# Patient Record
Sex: FEMALE | Race: WHITE | NOT HISPANIC OR LATINO | URBAN - METROPOLITAN AREA
[De-identification: names, ages, dates, MRNs, and addresses within clinical notes are randomized per-mention and may not be internally consistent; named-entity substitution may affect disease eponyms.]

---

## 2018-01-15 PROBLEM — M17.11 PRIMARY OSTEOARTHRITIS OF RIGHT KNEE: Status: ACTIVE | Noted: 2017-01-06

## 2018-01-15 PROBLEM — E55.9 VITAMIN D INSUFFICIENCY: Status: ACTIVE | Noted: 2017-04-26

## 2018-09-04 PROBLEM — Z80.0 FH: COLON CANCER: Status: ACTIVE | Noted: 2018-09-04

## 2020-10-08 ENCOUNTER — APPOINTMENT (RX ONLY)
Dept: URBAN - METROPOLITAN AREA CLINIC 23 | Facility: CLINIC | Age: 55
Setting detail: DERMATOLOGY
End: 2020-10-08

## 2020-10-08 VITALS — TEMPERATURE: 97.2 F

## 2020-10-08 DIAGNOSIS — L82.1 OTHER SEBORRHEIC KERATOSIS: ICD-10-CM

## 2020-10-08 DIAGNOSIS — D69.2 OTHER NONTHROMBOCYTOPENIC PURPURA: ICD-10-CM

## 2020-10-08 DIAGNOSIS — L81.4 OTHER MELANIN HYPERPIGMENTATION: ICD-10-CM

## 2020-10-08 DIAGNOSIS — D18.0 HEMANGIOMA: ICD-10-CM

## 2020-10-08 DIAGNOSIS — T07XXXA ABRASION OR FRICTION BURN OF OTHER, MULTIPLE, AND UNSPECIFIED SITES, WITHOUT MENTION OF INFECTION: ICD-10-CM

## 2020-10-08 DIAGNOSIS — L72.0 EPIDERMAL CYST: ICD-10-CM

## 2020-10-08 DIAGNOSIS — M35.1 OTHER OVERLAP SYNDROMES: ICD-10-CM

## 2020-10-08 DIAGNOSIS — D22 MELANOCYTIC NEVI: ICD-10-CM

## 2020-10-08 DIAGNOSIS — L20.89 OTHER ATOPIC DERMATITIS: ICD-10-CM

## 2020-10-08 PROBLEM — D22.72 MELANOCYTIC NEVI OF LEFT LOWER LIMB, INCLUDING HIP: Status: ACTIVE | Noted: 2020-10-08

## 2020-10-08 PROBLEM — D18.01 HEMANGIOMA OF SKIN AND SUBCUTANEOUS TISSUE: Status: ACTIVE | Noted: 2020-10-08

## 2020-10-08 PROBLEM — S60.511A ABRASION OF RIGHT HAND, INITIAL ENCOUNTER: Status: ACTIVE | Noted: 2020-10-08

## 2020-10-08 PROBLEM — D22.5 MELANOCYTIC NEVI OF TRUNK: Status: ACTIVE | Noted: 2020-10-08

## 2020-10-08 PROBLEM — L30.9 DERMATITIS, UNSPECIFIED: Status: ACTIVE | Noted: 2020-10-08

## 2020-10-08 PROCEDURE — ? TREATMENT REGIMEN

## 2020-10-08 PROCEDURE — ? OBSERVATION

## 2020-10-08 PROCEDURE — 99214 OFFICE O/P EST MOD 30 MIN: CPT

## 2020-10-08 PROCEDURE — ? OTHER

## 2020-10-08 PROCEDURE — ? COUNSELING

## 2020-10-08 PROCEDURE — ? ADDITIONAL NOTES

## 2020-10-08 ASSESSMENT — LOCATION ZONE DERM
LOCATION ZONE: FACE
LOCATION ZONE: LEG
LOCATION ZONE: FEET
LOCATION ZONE: TRUNK
LOCATION ZONE: HAND
LOCATION ZONE: ARM

## 2020-10-08 ASSESSMENT — LOCATION DETAILED DESCRIPTION DERM
LOCATION DETAILED: RIGHT MID-UPPER BACK
LOCATION DETAILED: LEFT POSTERIOR SHOULDER
LOCATION DETAILED: RIGHT SUPERIOR LATERAL UPPER BACK
LOCATION DETAILED: LEFT SUPERIOR LATERAL MIDBACK
LOCATION DETAILED: RIGHT ELBOW
LOCATION DETAILED: LEFT MEDIAL DORSAL FOOT
LOCATION DETAILED: RIGHT LATERAL SUPERIOR CHEST
LOCATION DETAILED: LEFT POPLITEAL SKIN
LOCATION DETAILED: RIGHT MEDIAL BUCCAL CHEEK
LOCATION DETAILED: RIGHT RADIAL DORSAL HAND
LOCATION DETAILED: LEFT LATERAL SUPERIOR CHEST
LOCATION DETAILED: LEFT MID-UPPER BACK
LOCATION DETAILED: LEFT DISTAL DORSAL FOREARM
LOCATION DETAILED: LEFT ELBOW
LOCATION DETAILED: LEFT DORSAL MIDDLE METACARPOPHALANGEAL JOINT
LOCATION DETAILED: RIGHT POPLITEAL SKIN
LOCATION DETAILED: RIGHT DISTAL DORSAL FOREARM
LOCATION DETAILED: LEFT RIB CAGE

## 2020-10-08 ASSESSMENT — LOCATION SIMPLE DESCRIPTION DERM
LOCATION SIMPLE: RIGHT ELBOW
LOCATION SIMPLE: RIGHT POPLITEAL SKIN
LOCATION SIMPLE: LEFT POPLITEAL SKIN
LOCATION SIMPLE: RIGHT HAND
LOCATION SIMPLE: RIGHT CHEEK
LOCATION SIMPLE: CHEST
LOCATION SIMPLE: LEFT HAND
LOCATION SIMPLE: LEFT FOOT
LOCATION SIMPLE: LEFT SHOULDER
LOCATION SIMPLE: RIGHT FOREARM
LOCATION SIMPLE: ABDOMEN
LOCATION SIMPLE: LEFT LOWER BACK
LOCATION SIMPLE: LEFT UPPER BACK
LOCATION SIMPLE: LEFT ELBOW
LOCATION SIMPLE: LEFT FOREARM
LOCATION SIMPLE: RIGHT UPPER BACK
LOCATION SIMPLE: RIGHT BACK

## 2020-10-08 NOTE — PROCEDURE: ADDITIONAL NOTES
Detail Level: Simple
Additional Notes: Pt sees rheumatologist.  (Dr. Nilesh Barba).  No treatment at present.

## 2020-10-08 NOTE — PROCEDURE: OTHER
Detail Level: Zone
Note Text (......Xxx Chief Complaint.): This diagnosis correlates with the
Other (Free Text): Will request records.  Discussed biopsy and/or patch testing if symptoms worsen or do not improve.
Other (Free Text): Recommend DerMend

## 2021-01-26 ENCOUNTER — RX ONLY (OUTPATIENT)
Age: 56
Setting detail: RX ONLY
End: 2021-01-26

## 2021-01-26 RX ORDER — TRIAMCINOLONE ACETONIDE 1 MG/G
CREAM TOPICAL BID
Qty: 80 | Refills: 6 | Status: ERX | COMMUNITY
Start: 2021-01-26

## 2021-08-26 ENCOUNTER — APPOINTMENT (RX ONLY)
Dept: URBAN - METROPOLITAN AREA CLINIC 23 | Facility: CLINIC | Age: 56
Setting detail: DERMATOLOGY
End: 2021-08-26
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
## 2021-08-26 DIAGNOSIS — L30.9 DERMATITIS, UNSPECIFIED: ICD-10-CM | Status: INADEQUATELY CONTROLLED

## 2021-08-26 DIAGNOSIS — Z71.89 OTHER SPECIFIED COUNSELING: ICD-10-CM

## 2021-08-26 DIAGNOSIS — L23.9 ALLERGIC CONTACT DERMATITIS, UNSPECIFIED CAUSE: ICD-10-CM

## 2021-08-26 DIAGNOSIS — L57.8 OTHER SKIN CHANGES DUE TO CHRONIC EXPOSURE TO NONIONIZING RADIATION: ICD-10-CM

## 2021-08-26 DIAGNOSIS — M35.1 OTHER OVERLAP SYNDROMES: ICD-10-CM

## 2021-08-26 DIAGNOSIS — D22 MELANOCYTIC NEVI: ICD-10-CM

## 2021-08-26 PROBLEM — D22.72 MELANOCYTIC NEVI OF LEFT LOWER LIMB, INCLUDING HIP: Status: ACTIVE | Noted: 2021-08-26
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
## 2021-08-26 PROBLEM — D22.5 MELANOCYTIC NEVI OF TRUNK: Status: ACTIVE | Noted: 2021-08-26

## 2021-08-26 PROCEDURE — ? PRESCRIPTION

## 2021-08-26 PROCEDURE — 99214 OFFICE O/P EST MOD 30 MIN: CPT

## 2021-08-26 PROCEDURE — ? TREATMENT REGIMEN

## 2021-08-26 PROCEDURE — ? OBSERVATION

## 2021-08-26 PROCEDURE — ? ADDITIONAL NOTES

## 2021-08-26 PROCEDURE — ? COUNSELING

## 2021-08-26 PROCEDURE — ? OTHER

## 2021-08-26 RX ORDER — METRONIDAZOLE 7.5 MG/G
CREAM TOPICAL BID
Qty: 1 | Refills: 3 | Status: ERX | COMMUNITY
Start: 2021-08-26

## 2021-08-26 RX ORDER — BETAMETHASONE DIPROPIONATE 0.5 MG/G
CREAM, AUGMENTED TOPICAL BID
Qty: 1 | Refills: 1 | Status: ERX | COMMUNITY
Start: 2021-08-26

## 2021-08-26 RX ORDER — MUPIROCIN 20 MG/G
OINTMENT TOPICAL TID
Qty: 2 | Refills: 2 | Status: ERX | COMMUNITY
Start: 2021-08-26

## 2021-08-26 RX ORDER — DOXYCYCLINE HYCLATE 100 MG/1
CAPSULE, GELATIN COATED ORAL BID
Qty: 28 | Refills: 0 | Status: ERX | COMMUNITY
Start: 2021-08-26

## 2021-08-26 RX ADMIN — MUPIROCIN: 20 OINTMENT TOPICAL at 00:00

## 2021-08-26 RX ADMIN — DOXYCYCLINE HYCLATE: 100 CAPSULE, GELATIN COATED ORAL at 00:00

## 2021-08-26 RX ADMIN — METRONIDAZOLE: 7.5 CREAM TOPICAL at 00:00

## 2021-08-26 RX ADMIN — BETAMETHASONE DIPROPIONATE: 0.5 CREAM, AUGMENTED TOPICAL at 00:00

## 2021-08-26 ASSESSMENT — LOCATION SIMPLE DESCRIPTION DERM
LOCATION SIMPLE: UPPER BACK
LOCATION SIMPLE: LEFT FOOT
LOCATION SIMPLE: LEFT CHEEK
LOCATION SIMPLE: LEFT POPLITEAL SKIN
LOCATION SIMPLE: RIGHT UPPER BACK
LOCATION SIMPLE: RIGHT CHEEK
LOCATION SIMPLE: ABDOMEN
LOCATION SIMPLE: LEFT CALF
LOCATION SIMPLE: CHEST

## 2021-08-26 ASSESSMENT — LOCATION DETAILED DESCRIPTION DERM
LOCATION DETAILED: RIGHT CENTRAL MALAR CHEEK
LOCATION DETAILED: LEFT RIB CAGE
LOCATION DETAILED: LEFT INFERIOR MEDIAL MALAR CHEEK
LOCATION DETAILED: LEFT MEDIAL SUPERIOR CHEST
LOCATION DETAILED: SUPERIOR THORACIC SPINE
LOCATION DETAILED: LEFT MEDIAL DORSAL FOOT
LOCATION DETAILED: LEFT POPLITEAL SKIN
LOCATION DETAILED: RIGHT MID-UPPER BACK
LOCATION DETAILED: LEFT PROXIMAL CALF

## 2021-08-26 ASSESSMENT — LOCATION ZONE DERM
LOCATION ZONE: TRUNK
LOCATION ZONE: LEG
LOCATION ZONE: FEET
LOCATION ZONE: FACE

## 2021-08-26 NOTE — PROCEDURE: OTHER
Family updated that pt is still undergoing the procedure. Writer will continue to update the family as the day progresses.   
Detail Level: Zone
Note Text (......Xxx Chief Complaint.): This diagnosis correlates with the
Render Risk Assessment In Note?: no
Other (Free Text): She used doxycycline for a previous occurrence, and that resolved the issue. Recommend 2 week course and initiating metronidazole topically.  Consider biopsy if improvement not noted.

## 2021-08-26 NOTE — HPI: RASH
What Type Of Note Output Would You Prefer (Optional)?: Standard Output
Is The Patient Presenting As Previously Scheduled?: Yes
How Severe Is Your Rash?: mild
Is This A New Presentation, Or A Follow-Up?: Rash
Additional History: Patient feel this is from her leather seat. She has a dog bit on her left lower leg shes packing it with dry gauze. She has a place of concern on her right cheek ENT told her it was cellulitis.

## 2021-08-26 NOTE — PROCEDURE: ADDITIONAL NOTES
Additional Notes: Pt sees rheumatologist.  (Dr. Nilesh Barba).  No treatment at present.
Detail Level: Simple

## 2022-02-24 ENCOUNTER — RX ONLY (OUTPATIENT)
Age: 57
Setting detail: RX ONLY
End: 2022-02-24

## 2022-02-24 ENCOUNTER — APPOINTMENT (RX ONLY)
Dept: URBAN - METROPOLITAN AREA CLINIC 23 | Facility: CLINIC | Age: 57
Setting detail: DERMATOLOGY
End: 2022-02-24

## 2022-02-24 DIAGNOSIS — L259 CONTACT DERMATITIS AND OTHER ECZEMA, UNSPECIFIED CAUSE: ICD-10-CM | Status: INADEQUATELY CONTROLLED

## 2022-02-24 DIAGNOSIS — L30.4 ERYTHEMA INTERTRIGO: ICD-10-CM | Status: INADEQUATELY CONTROLLED

## 2022-02-24 DIAGNOSIS — B36.8 OTHER SPECIFIED SUPERFICIAL MYCOSES: ICD-10-CM

## 2022-02-24 PROBLEM — L30.8 OTHER SPECIFIED DERMATITIS: Status: ACTIVE | Noted: 2022-02-24

## 2022-02-24 PROCEDURE — ? COUNSELING

## 2022-02-24 PROCEDURE — 99214 OFFICE O/P EST MOD 30 MIN: CPT

## 2022-02-24 PROCEDURE — ? PRESCRIPTION

## 2022-02-24 PROCEDURE — ? OTHER

## 2022-02-24 PROCEDURE — ? TREATMENT REGIMEN

## 2022-02-24 RX ORDER — CLOBETASOL PROPIONATE 0.5 MG/G
OINTMENT TOPICAL
Qty: 60 | Refills: 0 | Status: ERX | COMMUNITY
Start: 2022-02-24

## 2022-02-24 RX ORDER — BETAMETHASONE DIPROPIONATE 0.5 MG/G
CREAM, AUGMENTED TOPICAL
Qty: 50 | Refills: 2 | Status: ERX

## 2022-02-24 RX ORDER — FLUCONAZOLE 150 MG/1
TABLET ORAL
Qty: 7 | Refills: 0 | Status: ERX | COMMUNITY
Start: 2022-02-24

## 2022-02-24 RX ADMIN — FLUCONAZOLE: 150 TABLET ORAL at 00:00

## 2022-02-24 ASSESSMENT — LOCATION DETAILED DESCRIPTION DERM
LOCATION DETAILED: RIGHT MEDIAL BREAST 5-6:00 REGION
LOCATION DETAILED: INFERIOR THORACIC SPINE
LOCATION DETAILED: LEFT MEDIAL BREAST 7-8:00 REGION
LOCATION DETAILED: RIGHT LATERAL SUPERIOR CHEST
LOCATION DETAILED: LEFT MEDIAL SUPERIOR CHEST
LOCATION DETAILED: LEFT CLAVICULAR SKIN
LOCATION DETAILED: EPIGASTRIC SKIN

## 2022-02-24 ASSESSMENT — LOCATION SIMPLE DESCRIPTION DERM
LOCATION SIMPLE: UPPER BACK
LOCATION SIMPLE: LEFT CLAVICULAR SKIN
LOCATION SIMPLE: RIGHT BREAST
LOCATION SIMPLE: CHEST
LOCATION SIMPLE: ABDOMEN
LOCATION SIMPLE: LEFT BREAST

## 2022-02-24 ASSESSMENT — LOCATION ZONE DERM: LOCATION ZONE: TRUNK

## 2022-02-24 NOTE — PROCEDURE: OTHER
Other (Free Text): If inadequate improvement with diflucan, can add on hydrocortisone 2.5% cream
Render Risk Assessment In Note?: no
Note Text (......Xxx Chief Complaint.): This diagnosis correlates with the
Detail Level: Zone

## 2022-02-24 NOTE — PROCEDURE: TREATMENT REGIMEN
Initiate Treatment: Betamethasone augmented cream BID x 2 weeks\\nFragrance-free moisturizers and detergents
Detail Level: Zone

## 2022-03-18 PROBLEM — E55.9 VITAMIN D INSUFFICIENCY: Status: ACTIVE | Noted: 2017-04-26

## 2022-03-20 PROBLEM — Z80.0 FH: COLON CANCER: Status: ACTIVE | Noted: 2018-09-04

## 2022-03-20 PROBLEM — M17.11 PRIMARY OSTEOARTHRITIS OF RIGHT KNEE: Status: ACTIVE | Noted: 2017-01-06

## 2022-04-14 ENCOUNTER — HOSPITAL ENCOUNTER (OUTPATIENT)
Dept: PHYSICAL THERAPY | Age: 57
Discharge: HOME | End: 2022-04-14
Payer: COMMERCIAL

## 2022-04-14 DIAGNOSIS — M54.2 NECK PAIN: ICD-10-CM

## 2022-04-14 DIAGNOSIS — M50.30 DDD (DEGENERATIVE DISC DISEASE), CERVICAL: ICD-10-CM

## 2022-04-14 DIAGNOSIS — M54.12 CERVICAL RADICULOPATHY: ICD-10-CM

## 2022-04-14 DIAGNOSIS — M25.511 RIGHT SHOULDER PAIN, UNSPECIFIED CHRONICITY: ICD-10-CM

## 2022-04-14 PROCEDURE — 97161 PT EVAL LOW COMPLEX 20 MIN: CPT

## 2022-04-14 PROCEDURE — 97140 MANUAL THERAPY 1/> REGIONS: CPT

## 2022-04-14 PROCEDURE — 97110 THERAPEUTIC EXERCISES: CPT

## 2022-04-14 NOTE — PROGRESS NOTES
Isa Moscoso  : 1965  Payor: [employer], [address] [place] [address] / Plan: [place] [employer] / Product Type: Commerical /  [address] [place] [id number] at [address] [place] [address]. Jai Beaver, [address] [place] [address], [address] [place] [address]  [phone]   [phone]                                                          Maren Van PA      OUTPATIENT PHYSICAL THERAPY: Daily Treatment Note 2022 Visit Count:  1  ICD-10: Treatment Diagnosis:   Neck pain [M54.2]  Right shoulder pain, unspecified chronicity [M25.511]  DDD (degenerative disc disease), cervical [M50.30]  Cervical radiculopathy [M54.12]     Precautions/Allergies:   Aspirin, Hydrocodone, Sulfa (sulfonamide antibiotics), and Tramadol   Fall Risk Score: 0 (? 5 = High Risk)  MD Orders: Eval and Treat MEDICAL/REFERRING DIAGNOSIS:  Neck pain [M54.2]  Right shoulder pain, unspecified chronicity [M25.511]  DDD (degenerative disc disease), cervical [M50.30]  Cervical radiculopathy [M54.12]   DATE OF ONSET: Approximately 2 months ago  REFERRING PHYSICIAN: Preston Washington  RETURN PHYSICIAN APPOINTMENT: 2022             Pre-treatment Symptoms/Complaints: See Initial Eval Dated 2022 for more details. Pain: Initial:3/10  Medications Last Reviewed:  2022     Post Session: 3/10   Updated Objective Findings: See Initial Eval for more details. TREATMENT:   THERAPEUTIC EXERCISE: (15 minutes):  Exercises per grid below to improve mobility, strength and balance. Required minimal visual, verbal and manual cues to promote proper body alignment and promote proper body posture. Progressed resistance and complexity of movement as indicated.      Date:  2022 Date:   Date:     Activity/Exercise Parameters Parameters Parameters   Education HEP, POC, PT goals, anatomy/pathology     UBE      Rings      Wand FE ER 15x each     UT LS [id number]     Scapular Retraction [id number]     PCS  [id number]                             THERAPEUTIC ACTIVITY: ( 0 minutes): Activities per gid below to improve functional movement related mobility, strength and balance to improve neuro-muscular carryover to daily functional activities for improving patient's quality of life. Required visual, verbal and manual cues to promote proper body alignment and promote proper body posture/mechanics. Progressed resistance and complexity of movement as indicated. Date:  4/14/2022 Date:   Date:     Activity/Exercise Parameters Parameters Parameters                                                                               MANUAL THERAPY: (8 minutes): Joint mobilization, Soft tissue mobilization was utilized and necessary because of the patient's restricted joint motion and restricted motion of soft tissue mobility. Date  4/14/2022    Technique Used Grade  Level # Time(s) Effect while being performed          Jt mobs II III C 3-7 3min Improved mobility          Manual Tx  Cervical 5min Decreased pain                                     HEP Log Date 1.    4/14/2022   2.  4/14/2022   3. 4/14/2022   4.    5.           [employer] Portal  Treatment/Session Summary:    Response to Treatment: Pt demonstrated understanding of POC and initial HEP. No increase in pain or adverse reactions. Communication/Consultation:  POC, HEP, PT goals, Faxed initial evaluation to MD.   Equipment provided today: HEP Handout   Recommendations/Intent for next treatment session:   Next visit will focus on Manual Therapy Core Stability Pain Science Education Traction RTC strengthening soft tissue mobilization. Treatment Plan of Care Effective Dates: 4/14/2022 TO 7/13/2022 (90 days).   Frequency/Duration: 2-3 times a week for 90 Days             Total Treatment Billable Duration:   23  Rx plus Eval   PT Patient Time In/Time Out  Time In: 1606  Time Out: MATI Singh    Future Appointments   Date Time Provider Alcides Merino   5/13/2022  8:30 AM Christianne Calzada MD [facility] CMW CMW   5/25/2022  3:15 PM CED Santana POAG POA   7/29/2022 10:00 AM Savannah Amador MD [facility]   8/25/2022  8:30 AM HTF LAB RESOURCE [facility] HTF HTF   9/1/2022  8:00 AM Mikala William MD [facility] HTF HTF

## 2022-04-14 NOTE — THERAPY EVALUATION
Malik Watkins  : 1965  Primary: [employer]  Secondary:  [address]  at . Shanna [address]ova [address]  [address], [address] [facility]  [phone]   [phone]      OUTPATIENT PHYSICAL THERAPY:Initial Evaluation4/15/2022    ICD-10: Treatment Diagnosis:   Neck pain [M54.2]  Right shoulder pain, unspecified chronicity [M25.511]  DDD (degenerative disc disease), cervical [M50.30]  Cervical radiculopathy [M54.12]    Precautions/Allergies:   Aspirin, Hydrocodone, Sulfa (sulfonamide antibiotics), and Tramadol   Fall Risk Score: 0 (? 5 = High Risk)  MD Orders: Eval and Treat MEDICAL/REFERRING DIAGNOSIS:  Neck pain [M54.2]  Right shoulder pain, unspecified chronicity [M25.511]  DDD (degenerative disc disease), cervical [M50.30]  Cervical radiculopathy [M54.12]   DATE OF ONSET: Approximately 2 months ago  REFERRING PHYSICIAN: Viridiana Batista [place]  RETURN PHYSICIAN APPOINTMENT: 2022     INITIAL ASSESSMENT:  Ms. Malik Watkins has attended 1 physical therapy session including initial evaluation. Malik Watkins presents with S/S of increased pain, decreased ROM, decreased strength, decreased functional tolerance consistent with S/S of referring diagnosis. Malik Watkins will benefit from home exercise program, therapeutic and postural strengthening exercises, manual therapeutic techniques (ie. Distraction, SOR, myofascial release/soft tissue mobilization) as appropriate to address Stephany Ware's current condition. Malik Watkins will benefit from skilled PT (medically necessary) to address above deficits affecting participation in basic ADLs and overall functional tolerance. PROBLEM LIST (Impacting functional limitations):  1. Decreased Strength  2. Decreased ADL/Functional Activities  3. Decreased Transfer Abilities  4. Increased Pain  5. Decreased Activity Tolerance  6. Increased Fatigue  7.  Increased Shortness of Breath  8. Decreased Flexibility/Joint Mobility  9. Decreased [place] with Home Exercise Program INTERVENTIONS PLANNED:  1. Balance Exercise  2. Bed Mobility  3. Cold  4. Cryotherapy  5. Family Education  6. Gait Training  7. Heat  8. Home Exercise Program (HEP)  9. Manual Therapy  10. Neuromuscular Re-education/Strengthening  11. Range of Motion (ROM)  12. Therapeutic Activites  13. Therapeutic Exercise/Strengthening  14. Transfer Training   TREATMENT PLAN:  Effective Dates: 4/14/2022 TO 7/13/2022 (90 days). Frequency/Duration: 2-3 times a week for 90 Days  GOALS: (Goals have been discussed and agreed upon with patient.)     Short-Term Goals~4 weeks  Goal Met   1. Thea [place] Star will report <=4/10 pain to cervical spine with performance of functional spinal mobility and rotation. 1.  [] Date:   2. Thea [place] Star to be independent with initial HEP for cervical region and UE's. 3.  [] Date:   3. Thea [place] Star will improve ROM to >=90% to assist with improved function during instrumental activities of daily living. 4.  [] Date:   4. Thea [place] Star will improve MMT to >=4+/5 to all UE strengthening to return to PLOF and improve functional tolerance. 5.  [] Date:     6.  [] Date:         Long Term Goals~8 weeks Goal Met   1. Thea Harman will report <=0-2/10 pain to cervical spine with performance of functional spinal mobility and rotation and minimal to no difficulty with such tasks. 1.  [] Date:   2. Thea [place] Star will demonstrate improved NDI score, indicating spinal improvement from 4/50 to 2/50 affecting minimal to no difficulty with performance of cervical mobility and strengthening. 2.  [] Date:   3. Thea North Star to be independent with advanced HEP for cervical region and UE's. 3.  [] Date:               Outcome Measure:    Tool Used: Neck Disability Index (NDI)  Score:  Initial: 4/50  Most Recent: X/50 (Date: -- )   Interpretation of Score: The Neck Disability Index is a revised form of the Oswestry Low Back Pain Index and is designed to measure the activities of daily living in person's with neck pain. Each section is scored on a 0-5 scale, 5 representing the greatest disability. The scores of each section are added together for a total score of 50. Medical Necessity:   · Skilled intervention continues to be required due to address above deficits affecting participation in basic ADLs and overall functional tolerance. Reason for Services/Other Comments:  · Patient continues to require skilled intervention due to address above deficits affecting participation in basic ADLs and overall functional tolerance. Total Treatment Duration:  PT Patient Time In/Time Out  Time In: 1606  Time Out: 1705      Rehabilitation Potential For Stated Goals: [address] Fermín Ware's therapy, I certify that the treatment plan above will be carried out by a therapist or under their direction. Thank you for this referral,  Ward Carranza, MATI     Referring Physician Signature: CED Baptiste _______________________________ Date _____________            HISTORY:   History of Present Injury/Illness (Reason for Referral  Patient reports initial symptoms starting in January 2022 with worse right sided Cervical, shoulder and scapula pain starting about 2 months ago. Location:   Pain 1(P1):Right Scapula   Pain 2(P2): Right Shoulder    Pain Severity:  Current:3/10  Best: 3/10  Worst: 8/10    · Aggravating factors: Turning head, Laying down, Driving, Reading and Carrying  · Relieving factors: Rest  · Irritability: Medium (Onset of pain is equal to alleviation)      Past Medical History/Comorbidities:   Ms. Trevor Pichardo  has a past medical history of Allergic rhinitis, Arthritis, Asthma, Mixed connective tissue disease ([address] and Plantar fasciitis of left foot.   Ms. Trevor Pichardo  has a past surgical history that includes hx appendectomy (1974); hx cholecystectomy; hx oophorectomy (Left, ); hx tympanostomy (Right, 2016); hx heent; hx tonsil and adenoidectomy (); hx vein stripping (Right, 10/20/2017); vascular surgery procedure unlist (Right, 10/2017); hx knee replacement (Right, 2018); hx colonoscopy (2018); and hx other surgical (2021). Active Ambulatory Problems     Diagnosis Date Noted    Allergic rhinitis 2014    Mild intermittent asthma without complication     Hashimoto's thyroiditis     Eustachian tube dysfunction 2016    Primary osteoarthritis of right knee 2017    Vitamin D insufficiency 2017    FH: colon cancer 2018     Resolved Ambulatory Problems     Diagnosis Date Noted    No Resolved Ambulatory Problems     Past Medical History:   Diagnosis Date    Arthritis     Asthma     Mixed connective tissue disease ([facility] [address]     Plantar fasciitis of left foot      Social History/Living Environment:        Social History     Socioeconomic History    Marital status:      Spouse name: Not on file    Number of children: Not on file    Years of education: Not on file    Highest education level: Not on file   Occupational History    Not on file   Tobacco Use    Smoking status: Former Smoker     Years: 4.00     Types: Cigarettes     Quit date:      Years since quittin.3    Smokeless tobacco: Never Used   Substance and Sexual Activity    Alcohol use:  Yes     Alcohol/week: 2.0 standard drinks     Types: 2 Cans of beer per week     Comment: occ    Drug use: No    Sexual activity: Yes     Partners: Male     Birth control/protection: None   Other Topics Concern     Service Not Asked    Blood Transfusions Not Asked    Caffeine Concern Not Asked    Occupational Exposure Not Asked    Hobby Hazards Not Asked    Sleep Concern Not Asked    Stress Concern Not Asked    Weight Concern Not Asked    Special Diet Not Asked    Back Care Not Asked    Exercise Not Asked    Bike Helmet Not Asked    Seat Belt Yes    Self-Exams Yes   Social History Narrative    Denies physical or sexual abuse     Social Determinants of Health     Financial Resource Strain:     Difficulty of Paying Living Expenses: Not on file   Food Insecurity:     Worried About Running Out of Food in the Last Year: Not on file    Dimple of Food in the Last Year: Not on file   Transportation Needs:     Lack of Transportation (Medical): Not on file    Lack of Transportation (Non-Medical):  Not on file   Physical Activity:     Days of Exercise per Week: Not on file    Minutes of Exercise per Session: Not on file   Stress:     Feeling of Stress : Not on file   Social Connections:     Frequency of Communication with Friends and Family: Not on file    Frequency of Social Gatherings with Friends and Family: Not on file    Attends Baptism Services: Not on file    Active Member of [address] [place] [address] [employer] or Organizations: Not on file    Attends Club or Organization Meetings: Not on file    Marital Status: Not on file   Intimate Partner Violence:     Fear of Current or Ex-Partner: Not on file    Emotionally Abused: Not on file    Physically Abused: Not on file    Sexually Abused: Not on file   Housing Stability:     Unable to Pay for Housing in the Last Year: Not on file    Number of [place] in the Last Year: Not on file    Unstable Housing in the Last Year: Not on file      Prior Level of Function/Work/Activity:  Normal    Other Clinical Tests:          X-RAY Positive for Degenerative changes at C4-6    Previous Treatment Approaches:          Chiropractic Care  Current Medications:    Current Outpatient Medications:     tiZANidine (ZANAFLEX) 4 mg tablet, Take 1/2 to 1 tab q 8 hours prn muscle spasm, Disp: 24 Tablet, Rfl: 0    clotrimazole-betamethasone (LOTRISONE) topical cream, Apply  to affected area two (2) times a day., Disp: 45 g, Rfl: 1    nystatin (MYCOSTATIN) powder, Apply  to affected area four (4) times daily. , Disp: 1 Each, Rfl: 1    meloxicam (MOBIC) 15 mg tablet, Take 1 pill once a day after food as needed. , Disp: 30 Tablet, Rfl: 5    tobramycin (Tobrex) 0.3 % ophthalmic solution, Administer 1 Drop to both eyes every four (4) hours. , Disp: 1 Bottle, Rfl: 0    magnesium 250 mg tab, Take  by mouth., Disp: , Rfl:     BUDESONIDE NA, ADD 10ML (ONE DOSE) OF MEDICATION TO 240ML OF SALINE IN SINUS RINSE BOTTLE. IRRIGATE SINUSES WITH 120ML THROUGH EACH NOSTRIL TWICE DAILY, Disp: , Rfl:     Multivitamins with Fluoride (MULTI-VITAMIN PO), Take 1 Tab by mouth., Disp: , Rfl:     triamcinolone acetonide (KENALOG) 0.1 % topical cream, , Disp: , Rfl:     hydrocortisone (HYTONE) 2.5 % ointment, , Disp: , Rfl:     B.infantis-B.ani-B.long-B.bifi (PROBIOTIC 4X) 10-15 mg TbEC, Take  by mouth., Disp: , Rfl:     levocetirizine (XYZAL) 5 mg tablet, Take  by mouth., Disp: , Rfl:     guaiFENesin-dextromethorphan (MUCINEX DM) 600-30 mg per tablet, Take 1 Tab by mouth daily. , Disp: , Rfl:     montelukast (SINGULAIR) 10 mg tablet, Take 10 mg by mouth daily. , Disp: , Rfl:         Ambulatory/Rehab Services H2 Model Falls Risk Assessment    Risk Factors:       No Risk Factors Identified Ability to Rise from Chair:       (0)  Ability to rise in a single movement    Falls Prevention Plan:       No modifications necessary   Total: (5 or greater = High Risk): 0    ©2010 [facility] [employer]. All Rights Reserved. [facility] Patent #[id number],026. Federal Law prohibits the replication, distribution or use without written permission from [facility] [employer]         Date Last Reviewed:  4/15/2022     0: LOW COMPLEXITY   EXAMINATION:   Observation/Orthostatic Postural Assessment:           Forward shoulders and head  Palpation:          TTP medial border of right scapula  ROM:      AROM/PROM                  Joint: Eval Date: 4/14/2022  Re-Assess Date:  Re-Assess Date:    Active ROM           Cervical Extension 53      Cervical Flexion 35           RIGHT LEFT RIGHT LEFT RIGHT LEFT   Cervical Sidebending 25 25           Cervical Rotation 55 55                        Shoulder Flexion 152 163           Shoulder Abduction 141 158                        Lumbar Flexion WNL            Lumber Extension WNL                  Strength:     Eval Date: 4/14/2022  Re-Assess Date:  Re-Assess Date:      RIGHT LEFT RIGHT LEFT RIGHT LEFT   Shoulder Flexion  5/5  5/5           Shoulder Abduction (C5)  5/5  5/5           Shoulder Internal Rotation  5/5  5/5           Shoulder External Rotation  5/5  5/5           Elbow Flexion (C6)  5/5  5/5           Elbow Extension (C7)  5/5  5/5           Scapula  4/5  4+/5            Strength Normal    Normal                        Manual:  Joint Directon Grade Treatment Effect   Cervical C3-7 PA III Improved Symptoms post treatment   Thoracic T1-5 PA III Improved Symptoms post treatment     Reflex Testing (Biceps, Brachioradialis, Triceps): Yes     Reflexes  Location LEFT Right   Biceps Normal Normal   Brachioradialis Normal Normal   Triceps Normal Normal         Special Tests:    Cervical Distraction:Negative  Spurling's:Negative              Upper Limb Tension Test Median Nerve:Right: - Left: -              Upper Limb Tension Test Ulnar: Right: - Left: -              Upper Limb Tension Test Radial: Right: - Left: -     Patient denies Dysarthria, , Diplopia, Dizziness or Dysphagia      Neurological Screen: Radiating symptoms: YES     Functional Mobility:  Limited Cervical rotation and reaching overhead    Balance:  Normal     Body Structures Involved:  1. Nerves  2. Joints  3. Muscles  4. Ligaments Body Functions Affected:  1. Sensory/Pain  2. Neuromusculoskeletal  3. Movement Related Activities and Participation Affected:  1. Mobility  2.  Self Care     Number of elements that affect the Plan of Care: 1-2: LOW COMPLEXITY   CLINICAL PRESENTATION:   Presentation: Stable and uncomplicated: LOW COMPLEXITY   CLINICAL DECISION MAKING:      Use of outcome tool(s) and clinical judgement create a POC that gives a: Clear prediction of patient's progress: LOW COMPLEXITY   See treatment note for associated treatment provided today.       Future Appointments   Date Time Provider Alcides Angelique   4/19/2022  4:30 PM Cathy Grief, PT [facility]   4/22/2022  8:15 AM Cathy Grief, PT [facility]   4/26/2022  4:30 PM Cathy Grief, PT [facility]   4/28/2022  8:00 AM Trentne Veronique, PT [facility]   5/3/2022  4:30 PM Cathy Grief, PT [facility]   5/10/2022  8:00 AM Luis Veronique, PT [facility]   5/12/2022  4:30 PM [facility]   5/13/2022  8:30 AM Denver Quivers, MD [facility] CMW CMW   5/25/2022  3:15 PM Maren Van, PA POAG POA   7/29/2022 10:00 AM Yan Jerry MD [facility]   8/25/2022  8:30 AM HTF LAB RESOURCE [facility] HTF HTF   9/1/2022  8:00 AM Ashu William MD [facility] HTF HTF         Silverio El, PT

## 2022-04-19 ENCOUNTER — HOSPITAL ENCOUNTER (OUTPATIENT)
Dept: PHYSICAL THERAPY | Age: 57
Discharge: HOME | End: 2022-04-19
Payer: COMMERCIAL

## 2022-04-19 PROCEDURE — 97110 THERAPEUTIC EXERCISES: CPT

## 2022-04-19 PROCEDURE — 97140 MANUAL THERAPY 1/> REGIONS: CPT

## 2022-04-19 NOTE — PROGRESS NOTES
Scherrie Dakin  : 1965  Payor: [employer], [address] [place] [address] / Plan: [place] [employer] / Product Type: Commerical /  [address] [place] [address] at [address] [place] [address]. Jai Beaver, [address] [place] [address], [address] [place] [address]  [phone]   [phone]                                                          Maren Van PA      OUTPATIENT PHYSICAL THERAPY: Daily Treatment Note 2022 Visit Count:  2  ICD-10: Treatment Diagnosis:   Neck pain [M54.2]  Right shoulder pain, unspecified chronicity [M25.511]  DDD (degenerative disc disease), cervical [M50.30]  Cervical radiculopathy [M54.12]     Precautions/Allergies:   Aspirin, Hydrocodone, Sulfa (sulfonamide antibiotics), and Tramadol   Fall Risk Score: 0 (? 5 = High Risk)  MD Orders: Eval and Treat MEDICAL/REFERRING DIAGNOSIS:  Neck pain [M54.2]  Right shoulder pain, unspecified chronicity [M25.511]  DDD (degenerative disc disease), cervical [M50.30]  Cervical radiculopathy [M54.12]   DATE OF ONSET: Approximately 2 months ago  REFERRING PHYSICIAN: CED Hwang  RETURN PHYSICIAN APPOINTMENT: 2022             Pre-treatment Symptoms/Complaints: Pt report sthat she feels some tightness in her back aftrer last treatment   Pain: Initial:3/10  Medications Last Reviewed:  2022     Post Session: 3/10   Updated Objective Findings: See Initial Eval for more details. TREATMENT:   THERAPEUTIC EXERCISE: (30 minutes):  Exercises per grid below to improve mobility, strength and balance. Required minimal visual, verbal and manual cues to promote proper body alignment and promote proper body posture. Progressed resistance and complexity of movement as indicated.      Date:  2022 Date:  2022 Date:     Activity/Exercise Parameters Parameters Parameters   Education HEP, POC, PT goals, anatomy/pathology     UBE  6 min    Rings  15xs    Wand FE ER 15x each     UT LS [id number]     Scapular Retraction [id number] Rows blue band 30xs PCS  [id number]     Self mobilization  LAX ball in pillow case    Foam roller   4min                THERAPEUTIC ACTIVITY: ( 0 minutes): Activities per gid below to improve functional movement related mobility, strength and balance to improve neuro-muscular carryover to daily functional activities for improving patient's quality of life. Required visual, verbal and manual cues to promote proper body alignment and promote proper body posture/mechanics. Progressed resistance and complexity of movement as indicated. Date:  4/19/2022 Date:   Date:     Activity/Exercise Parameters Parameters Parameters                                                                               MANUAL THERAPY: (10 minutes): Joint mobilization, Soft tissue mobilization was utilized and necessary because of the patient's restricted joint motion and restricted motion of soft tissue mobility. Date  4/19/2022    Technique Used Grade Level # Time(s) Effect while being performed                        Manual Tx  Cervical 10 min Decreased pain                                     HEP Log Date 1.    4/19/2022   2.  4/19/2022   3. 4/19/2022   4.    5.           [employer] Portal  Treatment/Session Summary:    Response to Treatment: Pt demonstrated understanding of POC and initial HEP. No increase in pain or adverse reactions. Communication/Consultation:  POC, HEP, PT goals, Faxed initial evaluation to MD.   Equipment provided today: HEP Handout   Recommendations/Intent for next treatment session:   Next visit will focus on Manual Therapy Core Stability Pain Science Education Traction RTC strengthening soft tissue mobilization. Treatment Plan of Care Effective Dates: 4/14/2022 TO 7/13/2022 (90 days).   Frequency/Duration: 2-3 times a week for 90 Days             Total Treatment Billable Duration:   40  Rx   PT Patient Time In/Time Out  Time In: 1630  Time Out: Andra 13 Merian Mortimer, MATI    Future Appointments   Date Time Provider Alcides Angelique   4/22/2022  8:15 AM Obey Hampton, PT [facility]   4/26/2022  4:30 PM Obey Hampton, PT [facility]   4/28/2022  8:00 AM La Mailk, PT [facility]   5/3/2022  4:30 PM Obey Hampton, [facility]   5/10/2022  8:00 AM La Malik, PT [facility]   5/12/2022  4:30 PM Obey Hampton PT [facility]   5/13/2022  8:30 AM Jamila Araujo MD [facility] CM[facility] CMW   5/25/2022  3:15 PM CED Chun POAG POA   7/29/2022 10:00 AM Nilson Irwin MD [address] [place] [address]   8/25/2022  8:30 AM HTF LAB RESOURCE [facility] HTF HTF   9/1/2022  8:00 AM Félix William MD [facility]F HTF

## 2022-04-22 ENCOUNTER — HOSPITAL ENCOUNTER (OUTPATIENT)
Dept: PHYSICAL THERAPY | Age: 57
Discharge: HOME | End: 2022-04-22
Payer: COMMERCIAL

## 2022-04-22 PROCEDURE — 97110 THERAPEUTIC EXERCISES: CPT

## 2022-04-22 PROCEDURE — 97140 MANUAL THERAPY 1/> REGIONS: CPT

## 2022-04-22 NOTE — PROGRESS NOTES
Cristian Hernandez  : 1965  Payor: [employer], [address] [place] [address] / Plan: [place] [employer] / Product Type: Commerical /  [address] [place] [address] at [address] [place] [address]. Jai Beaver, [address] [place] [address]  [phone]   [phone]                                                          Maren Van PA      OUTPATIENT PHYSICAL THERAPY: Daily Treatment Note 2022 Visit Count:  3  ICD-10: Treatment Diagnosis:   Neck pain [M54.2]  Right shoulder pain, unspecified chronicity [M25.511]  DDD (degenerative disc disease), cervical [M50.30]  Cervical radiculopathy [M54.12]     Precautions/Allergies:   Aspirin, Hydrocodone, Sulfa (sulfonamide antibiotics), and Tramadol   Fall Risk Score: 0 (? 5 = High Risk)  MD Orders: Eval and Treat MEDICAL/REFERRING DIAGNOSIS:  Neck pain [M54.2]  Right shoulder pain, unspecified chronicity [M25.511]  DDD (degenerative disc disease), cervical [M50.30]  Cervical radiculopathy [M54.12]   DATE OF ONSET: Approximately 2 months ago  REFERRING PHYSICIAN: CED Ramires  RETURN PHYSICIAN APPOINTMENT: 2022             Pre-treatment Symptoms/Complaints: Pt report sthat she feels some tightness in her back aftrer last treatment   Pain: Initial:3/10  Medications Last Reviewed:  2022     Post Session: 3/10   Updated Objective Findings: See Initial Eval for more details. TREATMENT:   THERAPEUTIC EXERCISE: (30 minutes):  Exercises per grid below to improve mobility, strength and balance. Required minimal visual, verbal and manual cues to promote proper body alignment and promote proper body posture. Progressed resistance and complexity of movement as indicated.      Date:  2022 Date:  2022 Date:  2022   Activity/Exercise Parameters Parameters Parameters   Education HEP, POC, PT goals, anatomy/pathology     UBE  6 min 6 min   Rings  15xs 15xs    Wand FE ER 15x each     UT LS [id number]     Scapular Retraction [id number] Rows blue band 30xs 30xs   PCS  [id number]     Self mobilization  LAX ball in pillow case    Foam roller   4min    No money  Red band  Red band 30xs   pec stretch   3 min   Mid rows   3x10 blue   ER   Arms at 90 3x10                     THERAPEUTIC ACTIVITY: ( 0 minutes): Activities per gid below to improve functional movement related mobility, strength and balance to improve neuro-muscular carryover to daily functional activities for improving patient's quality of life. Required visual, verbal and manual cues to promote proper body alignment and promote proper body posture/mechanics. Progressed resistance and complexity of movement as indicated. Date:  4/22/2022 Date:   Date:     Activity/Exercise Parameters Parameters Parameters                                                                               MANUAL THERAPY: (15 minutes): Joint mobilization, Soft tissue mobilization was utilized and necessary because of the patient's restricted joint motion and restricted motion of soft tissue mobility. Date  4/22/2022    Technique Used Grade Level # Time(s) Effect while being performed   Imps, SALS II  5 min                  Manual Tx  Cervical 10 min Decreased pain                                     HEP Log Date 1.    4/22/2022   2.  4/22/2022   3. 4/22/2022   4.    5.           [employer] Portal  Treatment/Session Summary:    Response to Treatment: Pt demonstrated understanding of POC and initial HEP. No increase in pain or adverse reactions. Communication/Consultation:  POC, HEP, PT goals, Faxed initial evaluation to MD.   Equipment provided today: HEP Handout   Recommendations/Intent for next treatment session:   Next visit will focus on Manual Therapy Core Stability Pain Science Education Traction RTC strengthening soft tissue mobilization. Treatment Plan of Care Effective Dates: 4/14/2022 TO 7/13/2022 (90 days).   Frequency/Duration: 2-3 times a week for 90 Days Total Treatment Billable Duration:   45  Rx   PT Patient Time In/Time Out  Time In: 1644  Time Out: 0900  Leann Garza PT    Future Appointments   Date Time Provider Alcides Merino   4/26/2022  4:30 PM Trudy Abdullahi, PT [facility]   4/28/2022  8:00 AM Diana Welsh, PT [facility]   5/3/2022  4:30 PM Trudy Abdullahi, PT [facility]   5/10/2022  8:00 AM Diana Welsh, PT [facility]   5/12/2022  4:30 PM Trudy Abdullahi, PT [facility]   5/13/2022  8:30 AM Janeal Goldberg, MD [facility] CMW CMW   5/25/2022  3:15 PM Maren Van PA POAG POA   7/29/2022 10:00 AM Yves Sheehan MD [address] [place] [address]   8/25/2022  8:30 AM HTF LAB RESOURCE [facility] HTF HTF   9/1/2022  8:00 AM Kerry William MD [facility] HTF HTF

## 2022-04-26 ENCOUNTER — HOSPITAL ENCOUNTER (OUTPATIENT)
Dept: PHYSICAL THERAPY | Age: 57
Discharge: HOME | End: 2022-04-26
Payer: COMMERCIAL

## 2022-04-26 PROCEDURE — 97110 THERAPEUTIC EXERCISES: CPT

## 2022-04-26 PROCEDURE — 97140 MANUAL THERAPY 1/> REGIONS: CPT

## 2022-04-26 NOTE — PROGRESS NOTES
Murry Homans  : 1965  Payor: [employer], [address] [place] [address] / Plan: [place] [employer] / Product Type: Commerical /  [address] [place] [address] at [address] [place] [address]. Jai Beaver, [address] [place] [address]  [phone]   [phone]                                                          Maren Van PA      OUTPATIENT PHYSICAL THERAPY: Daily Treatment Note 2022 Visit Count:  4  ICD-10: Treatment Diagnosis:   Neck pain [M54.2]  Right shoulder pain, unspecified chronicity [M25.511]  DDD (degenerative disc disease), cervical [M50.30]  Cervical radiculopathy [M54.12]     Precautions/Allergies:   Aspirin, Hydrocodone, Sulfa (sulfonamide antibiotics), and Tramadol   Fall Risk Score: 0 (? 5 = High Risk)  MD Orders: Eval and Treat MEDICAL/REFERRING DIAGNOSIS:  Neck pain [M54.2]  Right shoulder pain, unspecified chronicity [M25.511]  DDD (degenerative disc disease), cervical [M50.30]  Cervical radiculopathy [M54.12]   DATE OF ONSET: Approximately 2 months ago  REFERRING PHYSICIAN: CED Ruff  RETURN PHYSICIAN APPOINTMENT: 2022             Pre-treatment Symptoms/Complaints: Pt report sthat she feels some tightness in her back aftrer last treatment   Pain: Initial:3/10  Medications Last Reviewed:  2022     Post Session: 3/10   Updated Objective Findings: See Initial Eval for more details. TREATMENT:   THERAPEUTIC EXERCISE: (30 minutes):  Exercises per grid below to improve mobility, strength and balance. Required minimal visual, verbal and manual cues to promote proper body alignment and promote proper body posture. Progressed resistance and complexity of movement as indicated.      Date:  2022 Date:  2022 Date:  2022 Date  2022   Activity/Exercise Parameters Parameters Parameters    Education HEP, POC, PT goals, anatomy/pathology      UBE  6 min 6 min 6 min   Rings  15xs 15xs  15xs   Wand FE ER 15x each      UT LS [id number] Scapular Retraction [id number] Rows blue band 30xs 30xs Blue band 30xs   PCS  [id number]      Self mobilization  LAX ball in pillow case     Foam roller   4min     No money  Red band  Red band 30xs    pec stretch   3 min    Mid rows   3x10 blue 3x10 blue   ER   Arms at 90 3x10 Arms at 90 3x10   Push up plus                     THERAPEUTIC ACTIVITY: ( 0 minutes): Activities per gid below to improve functional movement related mobility, strength and balance to improve neuro-muscular carryover to daily functional activities for improving patient's quality of life. Required visual, verbal and manual cues to promote proper body alignment and promote proper body posture/mechanics. Progressed resistance and complexity of movement as indicated. Date:  4/26/2022 Date:   Date:     Activity/Exercise Parameters Parameters Parameters                                                                               MANUAL THERAPY: (15 minutes): Joint mobilization, Soft tissue mobilization was utilized and necessary because of the patient's restricted joint motion and restricted motion of soft tissue mobility. Date  4/26/2022    Technique Used Grade Level # Time(s) Effect while being performed   Imps, SALS II  5 min                  Manual Tx  Cervical 10 min Decreased pain                                     HEP Log Date 1.    4/26/2022   2.  4/26/2022   3. 4/26/2022   4.    5.           [employer] Portal  Treatment/Session Summary:    Response to Treatment: Pt continued with scapular strength and thoracic mobility   Communication/Consultation:  POC, HEP, PT goals, Faxed initial evaluation to MD.   Equipment provided today: HEP Handout   Recommendations/Intent for next treatment session:   Next visit will focus on Manual Therapy Core Stability Pain Science Education Traction RTC strengthening soft tissue mobilization. Treatment Plan of Care Effective Dates: 4/14/2022 TO 7/13/2022 (90 days). Frequency/Duration: 2-3 times a week for 90 Days             Total Treatment Billable Duration:   45  Rx   PT Patient Time In/Time Out  Time In: 1630  Time Out: Andra 13 Prince Ontiveros, MATI    Future Appointments   Date Time Provider Alcides Merino   4/26/2022  4:30 PM Dandre Alanis, PT [facility]   4/28/2022  8:00 AM Nikhil Dejesus, PT [facility]   5/3/2022  4:30 PM Dandre Alanis, PT [facility]   5/10/2022  8:00 AM Nikhil Dejesus, PT [facility]   5/12/2022  4:30 PM Dandre Alanis, PT [facility]   5/13/2022  8:30 AM Yayo Mohr MD [facility] CMW CMW   5/25/2022  3:15 PM Maren Van PA POAG POA   7/29/2022 10:00 AM Yadi Mendez MD [address] [place] [address]   8/25/2022  8:30 AM HTF LAB RESOURCE [facility] HT[facility] HTF   9/1/2022  8:00 AM Riccardo William MD [facility] HTF HTF

## 2022-04-28 ENCOUNTER — HOSPITAL ENCOUNTER (OUTPATIENT)
Dept: PHYSICAL THERAPY | Age: 57
Discharge: HOME | End: 2022-04-28
Payer: COMMERCIAL

## 2022-04-28 PROCEDURE — 97140 MANUAL THERAPY 1/> REGIONS: CPT

## 2022-04-28 PROCEDURE — 97110 THERAPEUTIC EXERCISES: CPT

## 2022-04-28 NOTE — PROGRESS NOTES
Remy Elena  : 1965  Payor: [employer], [address] [place] [address] / Plan: [place] [employer] / Product Type: Commerical /  [address] [place] [address] at [address] [place] [address]. Jai Beaver, [address] [place] [address], [address] [place] [address]  [phone]   [phone]                                                          Maren Van PA      OUTPATIENT PHYSICAL THERAPY: Daily Treatment Note 2022 Visit Count:  5  ICD-10: Treatment Diagnosis:   Neck pain [M54.2]  Right shoulder pain, unspecified chronicity [M25.511]  DDD (degenerative disc disease), cervical [M50.30]  Cervical radiculopathy [M54.12]     Precautions/Allergies:   Aspirin, Hydrocodone, Sulfa (sulfonamide antibiotics), and Tramadol   Fall Risk Score: 0 (? 5 = High Risk)  MD Orders: Eval and Treat MEDICAL/REFERRING DIAGNOSIS:  Neck pain [M54.2]  Right shoulder pain, unspecified chronicity [M25.511]  DDD (degenerative disc disease), cervical [M50.30]  Cervical radiculopathy [M54.12]   DATE OF ONSET: Approximately 2 months ago  REFERRING PHYSICIAN: CED De Leon  RETURN PHYSICIAN APPOINTMENT: 2022             Pre-treatment Symptoms/Complaints: Patient reports soreness/tightness in right SCM, overall better   Pain: Initial:2/10  Medications Last Reviewed:  2022     Post Session: 2/10   Updated Objective Findings: See Initial Eval for more details. TREATMENT:   THERAPEUTIC EXERCISE: (32 minutes):  Exercises per grid below to improve mobility, strength and balance. Required minimal visual, verbal and manual cues to promote proper body alignment and promote proper body posture. Progressed resistance and complexity of movement as indicated.      Date:  2022 Date:  2022 Date:  2022 Date  2022 Date:  2022   Activity/Exercise Parameters Parameters Parameters     Education HEP, POC, PT goals, anatomy/pathology       UBE  6 min 6 min 6 min 4/4 min L3   Rings  15xs 15xs  15xs [id number]   Wand FE ER 15x each       UT LS [id number]       Scapular Retraction [id number] Rows blue band 30xs 30xs Blue band 30xs    PCS  [id number]       Self mobilization  LAX ball in pillow case      Nags snags     2m12zdq each   Foam roller   4min   Mid line 3way stretch 02x10bbj each   No money  Red band  Red band 30xs  Foam roll 20xs blue   Pull a part     Foam roll 20xs blue   pec stretch   3 min     Mid rows   3x10 blue 3x10 blue    ER   Arms at 90 3x10 Arms at 90 3x10    Push up plus                       THERAPEUTIC ACTIVITY: ( 0 minutes): Activities per gid below to improve functional movement related mobility, strength and balance to improve neuro-muscular carryover to daily functional activities for improving patient's quality of life. Required visual, verbal and manual cues to promote proper body alignment and promote proper body posture/mechanics. Progressed resistance and complexity of movement as indicated. Date:  4/28/2022 Date:   Date:     Activity/Exercise Parameters Parameters Parameters                                                                               MANUAL THERAPY: (10 minutes): Joint mobilization, Soft tissue mobilization was utilized and necessary because of the patient's restricted joint motion and restricted motion of soft tissue mobility. Date  4/28/2022    Technique Used Grade Level # Time(s) Effect while being performed   Imps, SALS II  5 min                  Manual Tx  Cervical 5 min Decreased pain                                     HEP Log Date 1.    4/28/2022   2.  4/28/2022   3. 4/28/2022   4.    5.           [employer] Portal  Treatment/Session Summary:    Response to Treatment: Pt was progressed with postural strengthening and ROM, reported less tightness after treatment.    Communication/Consultation:  POC, HEP, PT goals, Faxed initial evaluation to MD.   Equipment provided today: HEP Handout   Recommendations/Intent for next treatment session:   Next visit will focus on Manual Therapy Core Stability Pain Science Education Traction RTC strengthening soft tissue mobilization. Treatment Plan of Care Effective Dates: 4/14/2022 TO 7/13/2022 (90 days).   Frequency/Duration: 2-3 times a week for 90 Days             Total Treatment Billable Duration:   42  Rx   PT Patient Time In/Time Out  Time In: 0800  Time Out: [address], PT    Future Appointments   Date Time Provider Alcides Merino   5/3/2022  4:30 PM Mone Lima, PT [facility]   5/10/2022  8:00 AM Tong Alvarado, MATI [facility]   5/12/2022  4:30 PM Mone Lima PT [facility]   5/13/2022  8:30 AM Jennifer Harrell MD [facility] CMW CMW   5/25/2022  3:15 PM Maren Van PA POAG POA   7/29/2022 10:00 AM Octaviano Irizarry MD [facility]   8/25/2022  8:30 AM HTF LAB RESOURCE [facility] HTF HTF   9/1/2022  8:00 AM René William MD [facility] HTF HTF

## 2022-05-03 ENCOUNTER — HOSPITAL ENCOUNTER (OUTPATIENT)
Dept: PHYSICAL THERAPY | Age: 57
Discharge: HOME | End: 2022-05-03
Payer: COMMERCIAL

## 2022-05-03 PROCEDURE — 97110 THERAPEUTIC EXERCISES: CPT

## 2022-05-03 NOTE — PROGRESS NOTES
Shelton Wick  : 1965  Payor: [employer], [address] [place] [address] / Plan: [place] [employer] / Product Type: Commerical /  [address] [place] [address] at [address] [place] [address]. [address] [facility], [id number] [place] [address]  [phone]   [phone]                                                          Maren Van PA      OUTPATIENT PHYSICAL THERAPY: Daily Treatment Note 5/3/2022 Visit Count:  6  ICD-10: Treatment Diagnosis:   Neck pain [M54.2]  Right shoulder pain, unspecified chronicity [M25.511]  DDD (degenerative disc disease), cervical [M50.30]  Cervical radiculopathy [M54.12]     Precautions/Allergies:   Aspirin, Hydrocodone, Sulfa (sulfonamide antibiotics), and Tramadol   Fall Risk Score: 0 (? 5 = High Risk)  MD Orders: Eval and Treat MEDICAL/REFERRING DIAGNOSIS:  Neck pain [M54.2]  Right shoulder pain, unspecified chronicity [M25.511]  DDD (degenerative disc disease), cervical [M50.30]  Cervical radiculopathy [M54.12]   DATE OF ONSET: Approximately 2 months ago  REFERRING PHYSICIAN: [facility] Felix  RETURN PHYSICIAN APPOINTMENT: 2022             Pre-treatment Symptoms/Complaints: Patient reports soreness/tightness in right SCM, overall better   Pain: Initial:2/10  Medications Last Reviewed:  5/3/2022     Post Session: 2/10   Updated Objective Findings: See Initial Eval for more details. TREATMENT:   THERAPEUTIC EXERCISE: (45 minutes):  Exercises per grid below to improve mobility, strength and balance. Required minimal visual, verbal and manual cues to promote proper body alignment and promote proper body posture. Progressed resistance and complexity of movement as indicated.      Date:  2022 Date:  2022 Date  2022 Date:  2022 Date  5/3/2022   Activity/Exercise Parameters Parameters      Education        UBE 6 min 6 min 6 min 4/4 min L3 4/4 min L3   Rings 15xs 15xs  15xs [id number] 15xs   Wand FE ER        UT LS        Scapular Retraction Rows blue band 30xs 30xs Blue band 30xs     PCS         Self mobilization LAX ball in pillow case       Nags snags    0o47lkh each    Foam roller  4min   Mid line 3way stretch 73y41pyu each    No money Red band  Red band 30xs  Foam roll 20xs blue    Pull a part    Foam roll 20xs blue Foam roll 20xs blue   pec stretch  3 min      Mid rows  3x10 blue 3x10 blue  3x10 blue band    Lat pull downs     30 lbs 3x10   Landmine press     Bar 2x10   ER  Arms at 90 3x10 Arms at 90 3x10     Push up plus      20xs   Posterior shoulder stretch     2 min         THERAPEUTIC ACTIVITY: ( 0 minutes): Activities per gid below to improve functional movement related mobility, strength and balance to improve neuro-muscular carryover to daily functional activities for improving patient's quality of life. Required visual, verbal and manual cues to promote proper body alignment and promote proper body posture/mechanics. Progressed resistance and complexity of movement as indicated. Date:  5/3/2022 Date:   Date:     Activity/Exercise Parameters Parameters Parameters                                                                               MANUAL THERAPY: (0 minutes): Joint mobilization, Soft tissue mobilization was utilized and necessary because of the patient's restricted joint motion and restricted motion of soft tissue mobility.         Date  5/3/2022    Technique Used Grade Level # Time(s) Effect while being performed                                                                 HEP Log Date 1.    5/3/2022   2.  5/3/2022   3. 5/3/2022   4.    5.           [employer] Portal  Treatment/Session Summary:    Response to Treatment: Focused on strenghening and had some lower trap pain with scapular protraction   Communication/Consultation:  POC, HEP, PT goals, Faxed initial evaluation to MD.   Equipment provided today: HEP Handout   Recommendations/Intent for next treatment session:   Next visit will focus on Manual Therapy Core Stability Pain Science Education Traction RTC strengthening soft tissue mobilization. Treatment Plan of Care Effective Dates: 4/14/2022 TO 7/13/2022 (90 days).   Frequency/Duration: 2-3 times a week for 90 Days             Total Treatment Billable Duration:   45  Rx   PT Patient Time In/Time Out  Time In: 1640  Time Out: Andra 13 Amor Patel PT    Future Appointments   Date Time Provider Alcides Merino   5/10/2022  8:00 AM Gilles Husain PT [facility] AND [facility]   5/12/2022  4:30 PM Deborah Denny, PT [facility]   5/13/2022  8:30 AM Christianne Calzada MD [facility] CMW CMW   5/25/2022  3:15 PM Maren Van, PA POAG POA   7/29/2022 10:00 AM Savannah Amador MD [address] [facility]   8/25/2022  8:30 AM HTF LAB RESOURCE [facility] HTF HTF   9/1/2022  8:00 AM Mikala William MD [facility] HTF HTF

## 2022-05-10 ENCOUNTER — HOSPITAL ENCOUNTER (OUTPATIENT)
Dept: PHYSICAL THERAPY | Age: 57
Discharge: HOME | End: 2022-05-10
Payer: COMMERCIAL

## 2022-05-10 PROCEDURE — 97140 MANUAL THERAPY 1/> REGIONS: CPT

## 2022-05-10 PROCEDURE — 97110 THERAPEUTIC EXERCISES: CPT

## 2022-05-10 NOTE — PROGRESS NOTES
Simon Guerin  : 1965  Payor: [employer], [address] [place] [address] / Plan: [place] [employer] / Product Type: Commerical /  [address] at [address]. Jai Beaver, [address] [place] [address], [facility], [address] [place] [address]  [phone]   [phone]                                                          Maren Van PA      OUTPATIENT PHYSICAL THERAPY: Daily Treatment Note 5/10/2022 Visit Count:  7  ICD-10: Treatment Diagnosis:   Neck pain [M54.2]  Right shoulder pain, unspecified chronicity [M25.511]  DDD (degenerative disc disease), cervical [M50.30]  Cervical radiculopathy [M54.12]     Precautions/Allergies:   Aspirin, Hydrocodone, Sulfa (sulfonamide antibiotics), and Tramadol   Fall Risk Score: 0 (? 5 = High Risk)  MD Orders: Eval and Treat MEDICAL/REFERRING DIAGNOSIS:  Neck pain [M54.2]  Right shoulder pain, unspecified chronicity [M25.511]  DDD (degenerative disc disease), cervical [M50.30]  Cervical radiculopathy [M54.12]   DATE OF ONSET: Approximately 2 months ago  REFERRING PHYSICIAN: [facility] [place]  RETURN PHYSICIAN APPOINTMENT: 2022             Pre-treatment Symptoms/Complaints: Patient reports the same tightness on the right side of neck, \"Frustrated with slow progress\"   Pain: Initial:2/10  Medications Last Reviewed:  5/10/2022     Post Session: 2/10   Updated Objective Findings: Tightness pec minor right, less scaular winging right        TREATMENT:   THERAPEUTIC EXERCISE: (34 minutes):  Exercises per grid below to improve mobility, strength and balance. Required minimal visual, verbal and manual cues to promote proper body alignment and promote proper body posture. Progressed resistance and complexity of movement as indicated.      Date:  2022 Date  2022 Date:  2022 Date  5/3/2022 Date:  2022   Activity/Exercise Parameters       Education        UBE 6 min 6 min 4/4 min L3 4/4 min L3 3/3 min L3   Rings 15xs  15xs [id number] 15xs [id number] UT LS        Scapular Retraction 30xs Blue band 30xs      PCS         Self mobilization        Nags snags   2o09jlv each     FE wall slide w/lift off and ER band     15x orange   Foam roller    Mid line 3way stretch 88a69arx each  Mid line 3way stretch 93y33lsk each   No money Red band 30xs  Foam roll 20xs blue  Foam roll 20xs blue   Pull a part   Foam roll 20xs blue Foam roll 20xs blue    pec stretch 3 min    Right doorway   Mid rows 3x10 blue 3x10 blue  3x10 blue band     Lat pull downs    30 lbs 3x10    Landmine press    Bar 2x10    ER Arms at 90 3x10 Arms at 90 3x10      Push up plus     20xs 4x5 reps plinth   Posterior shoulder stretch    2 min 2min         THERAPEUTIC ACTIVITY: ( 0 minutes): Activities per gid below to improve functional movement related mobility, strength and balance to improve neuro-muscular carryover to daily functional activities for improving patient's quality of life. Required visual, verbal and manual cues to promote proper body alignment and promote proper body posture/mechanics. Progressed resistance and complexity of movement as indicated. Date:  5/10/2022 Date:   Date:     Activity/Exercise Parameters Parameters Parameters                                                                               MANUAL THERAPY: (8 minutes): Joint mobilization, Soft tissue mobilization was utilized and necessary because of the patient's restricted joint motion and restricted motion of soft tissue mobility.         Date  5/10/2022    Technique Used Grade Level # Time(s) Effect while being performed          Seated 1st rib and cervical PA mobs III C3-T2 8min                                                    HEP Log Date 1.    5/10/2022   2.  5/10/2022   3. 5/10/2022   4.    5.           [employer] Portal  Treatment/Session Summary:    Response to Treatment: Patient reported less symptoms of tightness and pain after treatment, added prone exercises to HEP Communication/Consultation:  POC, HEP, PT goals, Faxed initial evaluation to MD.   Equipment provided today: HEP Handout   Recommendations/Intent for next treatment session:   Next visit will focus on Manual Therapy Core Stability Pain Science Education Traction RTC strengthening soft tissue mobilization. Treatment Plan of Care Effective Dates: 4/14/2022 TO 7/13/2022 (90 days).   Frequency/Duration: 2-3 times a week for 90 Days             Total Treatment Billable Duration:   45  Rx   PT Patient Time In/Time Out  Time In: 0800  Time Out: [address], PT    Future Appointments   Date Time Provider Alcides Merino   5/12/2022  4:30 PM Lis Jacobo, PT [facility] AND [facility]   5/13/2022  8:30 AM Bisi Franklin MD [facility] CMW CMW

## 2022-05-12 ENCOUNTER — HOSPITAL ENCOUNTER (OUTPATIENT)
Dept: PHYSICAL THERAPY | Age: 57
Discharge: HOME | End: 2022-05-12
Payer: COMMERCIAL

## 2022-05-12 PROCEDURE — 97140 MANUAL THERAPY 1/> REGIONS: CPT

## 2022-05-12 PROCEDURE — 97110 THERAPEUTIC EXERCISES: CPT

## 2022-05-12 NOTE — PROGRESS NOTES
Dani Rivas  : 1965  Payor: [employer], [address] [place] [address] / Plan: [place] [employer] / Product Type: Commerical /  [address] [place] [address] at [address] [place] [address]. Jai Beaver, [address] [place] [address]  [phone]   [phone]                                                          Maren Van PA      OUTPATIENT PHYSICAL THERAPY: Daily Treatment Note 2022 Visit Count:  8  ICD-10: Treatment Diagnosis:   Neck pain [M54.2]  Right shoulder pain, unspecified chronicity [M25.511]  DDD (degenerative disc disease), cervical [M50.30]  Cervical radiculopathy [M54.12]     Precautions/Allergies:   Aspirin, Hydrocodone, Sulfa (sulfonamide antibiotics), and Tramadol   Fall Risk Score: 0 (? 5 = High Risk)  MD Orders: Eval and Treat MEDICAL/REFERRING DIAGNOSIS:  Neck pain [M54.2]  Right shoulder pain, unspecified chronicity [M25.511]  DDD (degenerative disc disease), cervical [M50.30]  Cervical radiculopathy [M54.12]   DATE OF ONSET: Approximately 2 months ago  REFERRING PHYSICIAN: CED [place]  RETURN PHYSICIAN APPOINTMENT: 2022             Pre-treatment Symptoms/Complaints: Patient reports the same tightness on the right side of neck, \"Frustrated with slow progress\"   Pain: Initial:2/10  Medications Last Reviewed:  2022     Post Session: 2/10   Updated Objective Findings: Tightness pec minor right, less scaular winging right        TREATMENT:   THERAPEUTIC EXERCISE: (34 minutes):  Exercises per grid below to improve mobility, strength and balance. Required minimal visual, verbal and manual cues to promote proper body alignment and promote proper body posture. Progressed resistance and complexity of movement as indicated.      Date  2022 Date:  2022 Date  5/3/2022 Date  2022   Activity/Exercise       Education       UBE 6 min 4/4 min L3 4/4 min L3 3/3 L3   Rings 15xs [id number] 15xs 15xs          UT LS       Scapular Retraction Blue band 30xs      PCS        Self mobilization       Nags snags  0o07kvf each     FE wall slide w/lift off and ER band       Foam roller   Mid line 3way stretch 63s06jlc each  Mid line 3way stretch 99o40uhx each   No money  Foam roll 20xs blue     Pull a part  Foam roll 20xs blue Foam roll 20xs blue Foam roll 20xs blue   pec stretch       Mid rows 3x10 blue  3x10 blue band  3x10 blue band /l   Lat pull downs   30 lbs 3x10 30 lbs 3x10   Landmine press   Bar 2x10    ER Arms at 90 3x10      Push up plus    20xs    Posterior shoulder stretch   2 min          THERAPEUTIC ACTIVITY: ( 0 minutes): Activities per gid below to improve functional movement related mobility, strength and balance to improve neuro-muscular carryover to daily functional activities for improving patient's quality of life. Required visual, verbal and manual cues to promote proper body alignment and promote proper body posture/mechanics. Progressed resistance and complexity of movement as indicated. Date:  5/12/2022 Date:   Date:     Activity/Exercise Parameters Parameters Parameters                                                                               MANUAL THERAPY: (8 minutes): Joint mobilization, Soft tissue mobilization was utilized and necessary because of the patient's restricted joint motion and restricted motion of soft tissue mobility.         Date  5/12/2022    Technique Used Grade Level # Time(s) Effect while being performed          Upper trap trigger point massage III  8min                                                    HEP Log Date 1.    5/12/2022   2.  5/12/2022   3. 5/12/2022   4.    5.           [employer] Portal  Treatment/Session Summary:    Response to Treatment: Patient reported less symptoms of tightness and pain after treatment, added prone exercises to HEP   Communication/Consultation:  POC, HEP, PT goals, Faxed initial evaluation to MD.   Equipment provided today: HEP Handout Recommendations/Intent for next treatment session:   Next visit will focus on Manual Therapy Core Stability Pain Science Education Traction RTC strengthening soft tissue mobilization. Treatment Plan of Care Effective Dates: 4/14/2022 TO 7/13/2022 (90 days).   Frequency/Duration: 2-3 times a week for 90 Days             Total Treatment Billable Duration:   42  Rx   PT Patient Time In/Time Out  Time In: 1630  Time Out: Andra 13 Jani Both, PT    Future Appointments   Date Time Provider Alcides Merino   5/13/2022  8:30 AM Ny Robledo MD [facility] CMW CMW

## 2022-05-25 ENCOUNTER — HOSPITAL ENCOUNTER (OUTPATIENT)
Dept: PHYSICAL THERAPY | Age: 57
Setting detail: RECURRING SERIES
Discharge: HOME | End: 2022-05-28
Payer: COMMERCIAL

## 2022-05-25 PROCEDURE — 97140 MANUAL THERAPY 1/> REGIONS: CPT

## 2022-05-25 PROCEDURE — 97110 THERAPEUTIC EXERCISES: CPT

## 2022-05-25 ASSESSMENT — PAIN SCALES - GENERAL: PAINLEVEL_OUTOF10: 5

## 2022-05-25 NOTE — PROGRESS NOTES
Obdulio Rollins  : 1965  Primary: [employer]  Secondary:  [address] @ [address] [facility] [address]  Phone: [phone]  Fax: [phone] No data recorded  No data recorded    PT Visit Info: Total # of Visits to Date: 9      OUTPATIENT PHYSICAL THERAPY:OP NOTE TYPE: Treatment Note 2022       Episode   Appt Desk       Treatment Diagnosis:  Neck pain [M54.2] Right shoulder pain, unspecified chronicity [M25.511] DDD (degenerative disc disease), cervical [M50.30]  Cervical radiculopathy [M54.12]  Medical/Referring Diagnosis:  Cervicalgia [M54.2]  Referring Physician:  Donnie Crocker MD Orders:  PT Eval and Treat   Date of Onset:    Approximately 2 months ago  Allergies:  Aspirin, Sulfa antibiotics, Hydrocodone, and Tramadol  Restrictions/Precautions:    No data recordedNo data recorded   Interventions Planned (Treatment may consist of any combination of the following):    No data recorded   Subjective Comments:  Patient reports doing really well while on vacation but symptoms returned after cleanining the house the last 2 days  Initial:     5/10 Post Session:     3/10  Medications Last Reviewed:  2022  Updated Objective Findings:  None Today  Treatment     THERAPEUTIC EXERCISE: (34 minutes):  Exercises per grid below to improve mobility, strength and balance. Required minimal visual, verbal and manual cues to promote proper body alignment and promote proper body posture.   Progressed resistance and complexity of movement as indicated.       Date  2022 Date:  2022 Date  5/3/2022 Date  2022 Date  2022   Activity/Exercise            Education            UBE 6 min 4/4 min L3 4/4 min L3 3/3 L3 3/3 L3   Rings 15xs [id number] 15xs 15xs [id number]                UT LS            Scapular Retraction Blue band 30xs       Right SL ER and Abd w/ left cervical SB 3x10 3#    Doorway right         2 way [id number] each Self mobilization            Nags snags   6x55ecj each        FE wall slide w/lift off and ER band            Foam roller    Mid line 3way stretch 36m76dvu each   Mid line 3way stretch 21j50ssy each    No money   Foam roll 20xs blue        Pull a part   Foam roll 20xs blue Foam roll 20xs blue Foam roll 20xs blue    pec stretch         1u09tqn   Mid rows 3x10 blue   3x10 blue band  3x10 blue band /l    Lat pull downs     30 lbs 3x10 30 lbs 3x10    Landmine press     Bar 2x10      ER Arms at 90 3x10          Push up plus      20xs      Posterior shoulder stretch     2 min               THERAPEUTIC ACTIVITY: ( 0 minutes): Activities per gid below to improve functional movement related mobility, strength and balance to improve neuro-muscular carryover to daily functional activities for improving patient's quality of life. Required visual, verbal and manual cues to promote proper body alignment and promote proper body posture/mechanics. Progressed resistance and complexity of movement as indicated.       Date:  5/12/2022 Date:    Date:      Activity/Exercise Parameters Parameters Parameters   ·   ·   ·   ·     ·   ·   ·   ·     ·   ·   ·   ·     ·   ·   ·   ·     ·   ·   ·   ·     ·   ·   ·   ·     ·   ·   ·   ·              MANUAL THERAPY: (8 minutes): Joint mobilization, Soft tissue mobilization was utilized and necessary because of the patient's restricted joint motion and restricted motion of soft tissue mobility.            Date  5/12/2022     Technique Used Grade Level # Time(s) Effect while being performed               Upper trap, Levator trigger point massage III   8min  Decreased reported pain                                                                                                 Treatment Plan of Care Effective Dates: 4/14/2022 TO 7/13/2022 (90 days).   Frequency/Duration: 2-3 times a week for 90 Days                    Treatment/Session Summary:    · Treatment Assessment:   Patient had improved reported pain after treatment  · Communication/Consultation:  None today  · Equipment provided today:  None  · Recommendations/Intent for next treatment session: Next visit will focus on Progression of HEP.     Total Treatment Billable Duration:  42 minutes  Time In: 1300  Time Out: Linda 94, PT       Post Session Pain  Charge Capture  MedBridge Portal  MD Guidelines  Scanned Media  Benefits  MyChart    Future Appointments   Date Time Provider Sander Cooley   5/27/2022  9:00 AM Alberta Glez, [place] [facility] S[facility]   6/1/2022  3:15 PM Lauren Maurer PA-C POAG [facility] AMB   7/29/2022 10:00 AM Kayla Sherwood MD [facility] AMB   8/25/2022  8:30 AM HTF LAB RESOURCE \[facility]\"" [facility] AMB   9/1/2022  8:00 AM Verner Shears, MD [facility] AMB

## 2022-05-26 ENCOUNTER — APPOINTMENT (RX ONLY)
Dept: URBAN - METROPOLITAN AREA CLINIC 23 | Facility: CLINIC | Age: 57
Setting detail: DERMATOLOGY
End: 2022-05-26

## 2022-05-26 DIAGNOSIS — D22 MELANOCYTIC NEVI: ICD-10-CM

## 2022-05-26 DIAGNOSIS — L23.9 ALLERGIC CONTACT DERMATITIS, UNSPECIFIED CAUSE: ICD-10-CM

## 2022-05-26 DIAGNOSIS — D18.0 HEMANGIOMA: ICD-10-CM

## 2022-05-26 DIAGNOSIS — L57.8 OTHER SKIN CHANGES DUE TO CHRONIC EXPOSURE TO NONIONIZING RADIATION: ICD-10-CM

## 2022-05-26 DIAGNOSIS — L82.0 INFLAMED SEBORRHEIC KERATOSIS: ICD-10-CM

## 2022-05-26 DIAGNOSIS — Z71.89 OTHER SPECIFIED COUNSELING: ICD-10-CM

## 2022-05-26 DIAGNOSIS — L56.8 OTHER SPECIFIED ACUTE SKIN CHANGES DUE TO ULTRAVIOLET RADIATION: ICD-10-CM

## 2022-05-26 DIAGNOSIS — L30.4 ERYTHEMA INTERTRIGO: ICD-10-CM

## 2022-05-26 DIAGNOSIS — L82.1 OTHER SEBORRHEIC KERATOSIS: ICD-10-CM

## 2022-05-26 PROBLEM — D18.01 HEMANGIOMA OF SKIN AND SUBCUTANEOUS TISSUE: Status: ACTIVE | Noted: 2022-05-26

## 2022-05-26 PROBLEM — D22.72 MELANOCYTIC NEVI OF LEFT LOWER LIMB, INCLUDING HIP: Status: ACTIVE | Noted: 2022-05-26

## 2022-05-26 PROBLEM — D22.5 MELANOCYTIC NEVI OF TRUNK: Status: ACTIVE | Noted: 2022-05-26

## 2022-05-26 PROCEDURE — ? PRESCRIPTION

## 2022-05-26 PROCEDURE — ? OBSERVATION

## 2022-05-26 PROCEDURE — ? COUNSELING

## 2022-05-26 PROCEDURE — 17110 DESTRUCTION B9 LES UP TO 14: CPT

## 2022-05-26 PROCEDURE — 99214 OFFICE O/P EST MOD 30 MIN: CPT | Mod: 25

## 2022-05-26 PROCEDURE — ? REFERRAL

## 2022-05-26 PROCEDURE — ? LIQUID NITROGEN

## 2022-05-26 PROCEDURE — ? OTHER

## 2022-05-26 PROCEDURE — ? PRESCRIPTION MEDICATION MANAGEMENT

## 2022-05-26 RX ORDER — HYDROCORTISONE 25 MG/G
CREAM TOPICAL
Qty: 30 | Refills: 3 | Status: ERX | COMMUNITY
Start: 2022-05-26

## 2022-05-26 RX ADMIN — HYDROCORTISONE: 25 CREAM TOPICAL at 00:00

## 2022-05-26 ASSESSMENT — LOCATION DETAILED DESCRIPTION DERM
LOCATION DETAILED: RIGHT MID-UPPER BACK
LOCATION DETAILED: RIGHT INFERIOR CENTRAL MALAR CHEEK
LOCATION DETAILED: LEFT SUPERIOR MEDIAL UPPER BACK
LOCATION DETAILED: LEFT CENTRAL MANDIBULAR CHEEK
LOCATION DETAILED: RIGHT MEDIAL BREAST 5-6:00 REGION
LOCATION DETAILED: EPIGASTRIC SKIN
LOCATION DETAILED: RIGHT CENTRAL MALAR CHEEK
LOCATION DETAILED: LEFT MEDIAL BREAST 8-9:00 REGION
LOCATION DETAILED: LEFT MEDIAL DORSAL FOOT
LOCATION DETAILED: STERNAL NOTCH
LOCATION DETAILED: LEFT MID-UPPER BACK
LOCATION DETAILED: LEFT CENTRAL MALAR CHEEK
LOCATION DETAILED: LEFT RIB CAGE
LOCATION DETAILED: SUPERIOR THORACIC SPINE
LOCATION DETAILED: LEFT LATERAL SUPERIOR CHEST

## 2022-05-26 ASSESSMENT — LOCATION SIMPLE DESCRIPTION DERM
LOCATION SIMPLE: RIGHT CHEEK
LOCATION SIMPLE: RIGHT UPPER BACK
LOCATION SIMPLE: CHEST
LOCATION SIMPLE: UPPER BACK
LOCATION SIMPLE: LEFT UPPER BACK
LOCATION SIMPLE: LEFT CHEEK
LOCATION SIMPLE: LEFT FOOT
LOCATION SIMPLE: LEFT BREAST
LOCATION SIMPLE: ABDOMEN
LOCATION SIMPLE: RIGHT BREAST

## 2022-05-26 ASSESSMENT — LOCATION ZONE DERM
LOCATION ZONE: TRUNK
LOCATION ZONE: FEET
LOCATION ZONE: FACE

## 2022-05-26 NOTE — HPI: EVALUATION OF SKIN LESION(S)
Hpi Title: Evaluation of a Skin Lesion
How Severe Are Your Spot(S)?: mild
Family Member: Sister & nephew

## 2022-05-26 NOTE — PROCEDURE: PRESCRIPTION MEDICATION MANAGEMENT
Continue Regimen: Nystatin BID
Detail Level: Zone
Initiate Treatment: Hydrocortisone 2.5% bid- mix with nystatin with flares\\nZeasorb powder for maintenance / prevention
Render In Strict Bullet Format?: No

## 2022-05-26 NOTE — PROCEDURE: LIQUID NITROGEN
Add 52 Modifier (Optional): no
Medical Necessity Information: It is in your best interest to select a reason for this procedure from the list below. All of these items fulfill various CMS LCD requirements except the new and changing color options.
Show Topical Anesthesia Variable?: Yes
Spray Paint Text: The liquid nitrogen was applied to the skin utilizing a spray paint frosting technique.
Post-Care Instructions: I reviewed with the patient in detail post-care instructions. Patient is to wear sunprotection, and avoid picking at any of the treated lesions. Pt may apply Vaseline to crusted or scabbing areas.
Medical Necessity Clause: This procedure was medically necessary because the lesions that were treated were:
Consent: The patient's consent was obtained including but not limited to risks of crusting, scabbing, blistering, scarring, darker or lighter pigmentary change, recurrence, incomplete removal and infection.
Detail Level: Detailed

## 2022-05-26 NOTE — PROCEDURE: OTHER
Other (Free Text): Recommended patch testing.  Sees Dr. Aiken.
Note Text (......Xxx Chief Complaint.): This diagnosis correlates with the
Detail Level: Zone
Render Risk Assessment In Note?: no

## 2022-05-27 ENCOUNTER — TELEPHONE (OUTPATIENT)
Dept: ORTHOPEDIC SURGERY | Age: 57
End: 2022-05-27

## 2022-05-27 ENCOUNTER — HOSPITAL ENCOUNTER (OUTPATIENT)
Dept: PHYSICAL THERAPY | Age: 57
Setting detail: RECURRING SERIES
Discharge: HOME | End: 2022-05-30
Payer: COMMERCIAL

## 2022-05-27 PROCEDURE — 97110 THERAPEUTIC EXERCISES: CPT

## 2022-05-27 ASSESSMENT — PAIN SCALES - GENERAL: PAINLEVEL_OUTOF10: 4

## 2022-05-27 NOTE — PROGRESS NOTES
mobilization         Nags snags         FE wall slide w/lift off and ER band         Foam roller    Mid line 3way stretch [id number] each  Review of exercises   No money         Pull a part Foam roll 20xs blue Foam roll 20xs blue     pec stretch     4f71pmy 2 min   Mid rows 3x10 blue band  3x10 blue band /l     Lat pull downs 30 lbs 3x10 30 lbs 3x10  30 lbs 3x10   Landmine press Bar 2x10       ER         Push up plus  20xs       Posterior shoulder stretch 2 min       Self stretch shoulder depression    10 min using bands with set up and dosage   rows    30lbs 3x10   Burris carries    15lbs 5 laps                   THERAPEUTIC ACTIVITY: ( 0 minutes): Activities per gid below to improve functional movement related mobility, strength and balance to improve neuro-muscular carryover to daily functional activities for improving patient's quality of life. Required visual, verbal and manual cues to promote proper body alignment and promote proper body posture/mechanics. Progressed resistance and complexity of movement as indicated.       Date:  5/12/2022 Date:    Date:      Activity/Exercise Parameters Parameters Parameters   ·   ·   ·   ·     ·   ·   ·   ·     ·   ·   ·   ·     ·   ·   ·   ·     ·   ·   ·   ·     ·   ·   ·   ·     ·   ·   ·   ·              MANUAL THERAPY: (0 minutes): Joint mobilization, Soft tissue mobilization was utilized and necessary because of the patient's restricted joint motion and restricted motion of soft tissue mobility.            Date  5/12/2022     Technique Used Grade Level # Time(s) Effect while being performed                                                                                                             Treatment Plan of Care Effective Dates: 4/14/2022 TO 7/13/2022 (90 days).   Frequency/Duration: 2-3 times a week for 90 Days                    Treatment/Session Summary:    · Treatment Assessment:   PLease see discharge summary date 5/27/22 for more details.   · Communication/Consultation:  None today  · Equipment provided today:  HEP Review  Recommendations/Intent for next treatment session: Discharge to HEP  Total Treatment Billable Duration:  54 minutes  Time In: 0858  Time Out: [address]  Smita Valentin PT       Post Session Pain  Charge Capture  MedBridge Portal  MD Guidelines  Scanned Media  Benefits  MyChart    Future Appointments   Date Time Provider Sander Cooley   6/1/2022  3:15 PM Lauren Maurer PA-C POAG [facility] AMB   7/29/2022 10:00 AM Nina Molina MD [facility] AMB   8/25/2022  8:30 AM \[facility]\"" LAB RESOURCE [facility] AMB   9/1/2022  8:00 AM Laisha Taylor MD [facility] AMB

## 2022-05-27 NOTE — TELEPHONE ENCOUNTER
Spoke with pt, PT has helped tremendously,she is going to cancel her follow up with [facility] at this time, will call back if symptoms return.

## 2022-06-06 DIAGNOSIS — Z12.31 VISIT FOR SCREENING MAMMOGRAM: ICD-10-CM

## 2022-08-25 ENCOUNTER — NURSE ONLY (OUTPATIENT)
Dept: FAMILY MEDICINE CLINIC | Facility: CLINIC | Age: 57
End: 2022-08-25

## 2022-08-25 DIAGNOSIS — Z00.00 ROUTINE GENERAL MEDICAL EXAMINATION AT A HEALTH CARE FACILITY: Primary | ICD-10-CM

## 2022-08-25 LAB
APPEARANCE UR: CLEAR
BASOPHILS # BLD: 0 K/UL (ref 0–0.2)
BASOPHILS NFR BLD: 1 % (ref 0–2)
BILIRUB UR QL: NEGATIVE
COLOR UR: NORMAL
DIFFERENTIAL METHOD BLD: NORMAL
EOSINOPHIL # BLD: 0.1 K/UL (ref 0–0.8)
EOSINOPHIL NFR BLD: 2 % (ref 0.5–7.8)
ERYTHROCYTE [DISTWIDTH] IN BLOOD BY AUTOMATED COUNT: 12.4 % (ref 11.9–14.6)
GLUCOSE UR STRIP.AUTO-MCNC: NEGATIVE MG/DL
HCT VFR BLD AUTO: 46.1 % (ref 35.8–46.3)
HGB BLD-MCNC: 14.5 G/DL (ref 11.7–15.4)
HGB UR QL STRIP: NEGATIVE
IMM GRANULOCYTES # BLD AUTO: 0 K/UL (ref 0–0.5)
IMM GRANULOCYTES NFR BLD AUTO: 0 % (ref 0–5)
KETONES UR QL STRIP.AUTO: NEGATIVE MG/DL
LEUKOCYTE ESTERASE UR QL STRIP.AUTO: NEGATIVE
LYMPHOCYTES # BLD: 1.7 K/UL (ref 0.5–4.6)
LYMPHOCYTES NFR BLD: 29 % (ref 13–44)
MCH RBC QN AUTO: 30.5 PG (ref 26.1–32.9)
MCHC RBC AUTO-ENTMCNC: 31.5 G/DL (ref 31.4–35)
MCV RBC AUTO: 97.1 FL (ref 79.6–97.8)
MONOCYTES # BLD: 0.4 K/UL (ref 0.1–1.3)
MONOCYTES NFR BLD: 7 % (ref 4–12)
NEUTS SEG # BLD: 3.5 K/UL (ref 1.7–8.2)
NEUTS SEG NFR BLD: 61 % (ref 43–78)
NITRITE UR QL STRIP.AUTO: NEGATIVE
NRBC # BLD: 0 K/UL (ref 0–0.2)
PH UR STRIP: 7.5 [PH] (ref 5–9)
PLATELET # BLD AUTO: 231 K/UL (ref 150–450)
PMV BLD AUTO: 11.5 FL (ref 9.4–12.3)
PROT UR STRIP-MCNC: NEGATIVE MG/DL
RBC # BLD AUTO: 4.75 M/UL (ref 4.05–5.2)
SP GR UR REFRACTOMETRY: 1.01 (ref 1–1.02)
UROBILINOGEN UR QL STRIP.AUTO: 0.2 EU/DL (ref 0.2–1)
WBC # BLD AUTO: 5.7 K/UL (ref 4.3–11.1)

## 2022-08-26 LAB
ALBUMIN SERPL-MCNC: 3.6 G/DL (ref 3.5–5)
ALBUMIN/GLOB SERPL: 1.1 {RATIO} (ref 1.2–3.5)
ALP SERPL-CCNC: 88 U/L (ref 50–136)
ALT SERPL-CCNC: 30 U/L (ref 12–65)
ANION GAP SERPL CALC-SCNC: 4 MMOL/L (ref 7–16)
AST SERPL-CCNC: 14 U/L (ref 15–37)
BILIRUB SERPL-MCNC: 0.5 MG/DL (ref 0.2–1.1)
BUN SERPL-MCNC: 16 MG/DL (ref 6–23)
CALCIUM SERPL-MCNC: 9.2 MG/DL (ref 8.3–10.4)
CHLORIDE SERPL-SCNC: 108 MMOL/L (ref 98–107)
CHOLEST SERPL-MCNC: 165 MG/DL
CO2 SERPL-SCNC: 28 MMOL/L (ref 21–32)
CREAT SERPL-MCNC: 0.7 MG/DL (ref 0.6–1)
GLOBULIN SER CALC-MCNC: 3.2 G/DL (ref 2.3–3.5)
GLUCOSE SERPL-MCNC: 89 MG/DL (ref 65–100)
HDLC SERPL-MCNC: 48 MG/DL (ref 40–60)
HDLC SERPL: 3.4 {RATIO}
LDLC SERPL CALC-MCNC: 98.8 MG/DL
POTASSIUM SERPL-SCNC: 4.3 MMOL/L (ref 3.5–5.1)
PROT SERPL-MCNC: 6.8 G/DL (ref 6.3–8.2)
SODIUM SERPL-SCNC: 140 MMOL/L (ref 136–145)
TRIGL SERPL-MCNC: 91 MG/DL (ref 35–150)
TSH, 3RD GENERATION: 1.46 UIU/ML (ref 0.36–3.74)
VLDLC SERPL CALC-MCNC: 18.2 MG/DL (ref 6–23)

## 2022-09-01 ENCOUNTER — OFFICE VISIT (OUTPATIENT)
Dept: FAMILY MEDICINE CLINIC | Facility: CLINIC | Age: 57
End: 2022-09-01
Payer: COMMERCIAL

## 2022-09-01 VITALS
BODY MASS INDEX: 28.56 KG/M2 | DIASTOLIC BLOOD PRESSURE: 72 MMHG | HEIGHT: 67 IN | TEMPERATURE: 97 F | WEIGHT: 182 LBS | SYSTOLIC BLOOD PRESSURE: 124 MMHG | OXYGEN SATURATION: 99 % | HEART RATE: 65 BPM

## 2022-09-01 DIAGNOSIS — L30.9 ECZEMA, UNSPECIFIED TYPE: ICD-10-CM

## 2022-09-01 DIAGNOSIS — M17.11 PRIMARY OSTEOARTHRITIS OF RIGHT KNEE: ICD-10-CM

## 2022-09-01 DIAGNOSIS — E06.3 HASHIMOTO'S THYROIDITIS: ICD-10-CM

## 2022-09-01 DIAGNOSIS — Z00.00 ROUTINE GENERAL MEDICAL EXAMINATION AT A HEALTH CARE FACILITY: Primary | ICD-10-CM

## 2022-09-01 PROCEDURE — 99396 PREV VISIT EST AGE 40-64: CPT | Performed by: FAMILY MEDICINE

## 2022-09-01 RX ORDER — CLOTRIMAZOLE AND BETAMETHASONE DIPROPIONATE 10; .64 MG/G; MG/G
CREAM TOPICAL 2 TIMES DAILY
Qty: 45 G | Refills: 1 | Status: CANCELLED | OUTPATIENT
Start: 2022-09-01

## 2022-09-01 RX ORDER — CLOBETASOL PROPIONATE 0.5 MG/G
OINTMENT TOPICAL 2 TIMES DAILY
Qty: 60 G | Refills: 1 | Status: SHIPPED | OUTPATIENT
Start: 2022-09-01

## 2022-09-01 ASSESSMENT — PATIENT HEALTH QUESTIONNAIRE - PHQ9
SUM OF ALL RESPONSES TO PHQ9 QUESTIONS 1 & 2: 0
SUM OF ALL RESPONSES TO PHQ QUESTIONS 1-9: 0
SUM OF ALL RESPONSES TO PHQ QUESTIONS 1-9: 0
1. LITTLE INTEREST OR PLEASURE IN DOING THINGS: 0
2. FEELING DOWN, DEPRESSED OR HOPELESS: 0
SUM OF ALL RESPONSES TO PHQ QUESTIONS 1-9: 0
SUM OF ALL RESPONSES TO PHQ QUESTIONS 1-9: 0

## 2022-09-01 ASSESSMENT — ENCOUNTER SYMPTOMS
ABDOMINAL PAIN: 0
CONSTIPATION: 0
SHORTNESS OF BREATH: 0
SINUS PAIN: 0
CHEST TIGHTNESS: 0
COUGH: 0
EYE DISCHARGE: 0
DIARRHEA: 0

## 2022-09-01 NOTE — PROGRESS NOTES
Wilfredo Krause is a 61 y.o. female who presents with   Chief Complaint   Patient presents with    Annual Exam       History of Present Illness    Here for CPX. Sinus rinses help with congestion, but still with intermittent R sinus pain. Had surgery for nasal prolapse in Dec.  Sees allergist for shots. No cp, sob, gi or urinary sxs. Mood good. Has lost weight- eating healthily and exercising. Periodic eczema- Temovate helps. Review of Systems  Review of Systems   Constitutional:  Negative for appetite change, fatigue and fever. HENT:  Negative for congestion, ear pain and sinus pain. Eyes:  Negative for discharge. Respiratory:  Negative for cough, chest tightness and shortness of breath. Cardiovascular:  Negative for chest pain, palpitations and leg swelling. Gastrointestinal:  Negative for abdominal pain, constipation and diarrhea. Genitourinary:  Negative for dysuria. Musculoskeletal:  Negative for joint swelling. Skin:  Negative for rash. Neurological:  Negative for headaches. Hematological:  Negative for adenopathy. Psychiatric/Behavioral:  Negative for dysphoric mood. The patient is not nervous/anxious. Medications  Current Outpatient Medications   Medication Sig Dispense Refill    clobetasol (TEMOVATE) 0.05 % ointment Apply topically 2 times daily Apply topically 2 times daily. 60 g 1    BUDESONIDE NA ADD 10ML (ONE DOSE) OF MEDICATION TO 240ML OF SALINE IN SINUS RINSE BOTTLE.  IRRIGATE SINUSES WITH 120ML THROUGH EACH NOSTRIL TWICE DAILY      clotrimazole-betamethasone (LOTRISONE) 1-0.05 % cream Apply topically 2 times daily      dextromethorphan-guaiFENesin (MUCINEX DM)  MG per extended release tablet Take 1 tablet by mouth daily      levocetirizine (XYZAL) 5 MG tablet Take by mouth      montelukast (SINGULAIR) 10 MG tablet Take 10 mg by mouth daily      nystatin (MYCOSTATIN) 982686 UNIT/GM powder Apply topically 4 times daily       No current facility-administered medications for this visit.         Past Medical History  Past Medical History:   Diagnosis Date    Allergic rhinitis     Arthritis     Asthma     Mixed connective tissue disease ([facility] [address]     Plantar fasciitis of left foot        Surgical History  Past Surgical History:   Procedure Laterality Date    APPENDECTOMY  1974    CHOLECYSTECTOMY      COLONOSCOPY  03/2018    WNL- Repeat in 5 years    HEENT      sinus surgery    OTHER SURGICAL HISTORY  12/2021    nasal prolaspe surgery     OVARY REMOVAL Left 1999    TONSILLECTOMY AND ADENOIDECTOMY  1966    TOTAL KNEE ARTHROPLASTY Right 11/20/2018    TYMPANOSTOMY TUBE PLACEMENT Right 06/2016    VASCULAR SURGERY Right 10/2017    Leg -cauterizing - veins    VEIN SURGERY Right 10/20/2017    Closed vein off          Family History  Family History   Problem Relation Age of Onset    Elevated Lipids Mother     Other Mother         mixed connective tissue disorder    Hypertension Father     Cancer Father         lung    Cancer Sister         liver    Cancer Sister         lymphoma     Elevated Lipids Sister     Hypertension Sister     Breast Cancer Sister     Lung Cancer Sister     Hypertension Brother     Elevated Lipids Brother     High Cholesterol Brother     Hypertension Brother     Colon Cancer Brother     High Cholesterol Brother     Lung Cancer Sister     Hypertension Sister     High Cholesterol Sister     Hypertension Brother     Thyroid Disease Daughter         Hashimoto's thyroiditis    No Known Problems Maternal Grandmother     No Known Problems Maternal Grandfather     No Known Problems Paternal Grandmother     No Known Problems Paternal Grandfather        Social History  Social History     Socioeconomic History    Marital status:      Spouse name: Not on file    Number of children: Not on file    Years of education: Not on file    Highest education level: Not on file   Occupational History    Not on file   Tobacco Use    Smoking status: Former Effort: Pulmonary effort is normal.      Breath sounds: Normal breath sounds. No wheezing. Chest:   Breasts:     Breasts are symmetrical.      Right: No swelling, bleeding, inverted nipple, mass, nipple discharge, skin change or tenderness. Left: No swelling, bleeding, inverted nipple, mass, nipple discharge, skin change or tenderness. Abdominal:      General: Bowel sounds are normal. There is no distension. Palpations: Abdomen is soft. There is no mass. Tenderness: There is no abdominal tenderness. Hernia: No hernia is present. Genitourinary:     General: Normal vulva. Vagina: No vaginal discharge. Rectum: Normal. Guaiac result negative. Musculoskeletal:         General: No tenderness. Cervical back: Normal range of motion. Right lower leg: No edema. Left lower leg: No edema. Lymphadenopathy:      Cervical: No cervical adenopathy. Skin:     General: Skin is warm and dry. Findings: No rash. Neurological:      General: No focal deficit present. Mental Status: She is alert and oriented to person, place, and time. Psychiatric:         Mood and Affect: Mood normal.         Thought Content: Thought content normal.        Assessment and Plan  Brain Daryn was seen today for annual exam.    Diagnoses and all orders for this visit:    Routine general medical examination at a health care facility    Hashimoto's thyroiditis    Primary osteoarthritis of right knee    Eczema, unspecified type  -     clobetasol (TEMOVATE) 0.05 % ointment; Apply topically 2 times daily Apply topically 2 times daily.   Labs look good  Cont diet/exercise  Recheck 1 year  F/U with rheumatology and GYN

## 2022-09-30 RX ORDER — DOXYCYCLINE HYCLATE 100 MG/1
100 CAPSULE ORAL 2 TIMES DAILY
Qty: 20 CAPSULE | Refills: 0 | Status: SHIPPED | OUTPATIENT
Start: 2022-09-30 | End: 2022-10-10

## 2022-11-22 ENCOUNTER — APPOINTMENT (RX ONLY)
Dept: URBAN - METROPOLITAN AREA CLINIC 23 | Facility: CLINIC | Age: 57
Setting detail: DERMATOLOGY
End: 2022-11-22

## 2022-11-22 DIAGNOSIS — L30.9 DERMATITIS, UNSPECIFIED: ICD-10-CM

## 2022-11-22 DIAGNOSIS — L40.8 OTHER PSORIASIS: ICD-10-CM

## 2022-11-22 PROCEDURE — 11102 TANGNTL BX SKIN SINGLE LES: CPT

## 2022-11-22 PROCEDURE — ? PRESCRIPTION

## 2022-11-22 PROCEDURE — ? COUNSELING

## 2022-11-22 PROCEDURE — 99213 OFFICE O/P EST LOW 20 MIN: CPT | Mod: 25

## 2022-11-22 PROCEDURE — ? BIOPSY BY SHAVE METHOD

## 2022-11-22 PROCEDURE — ? PRESCRIPTION MEDICATION MANAGEMENT

## 2022-11-22 RX ORDER — CLOBETASOL PROPIONATE 0.5 MG/ML
SOLUTION TOPICAL
Qty: 50 | Refills: 6 | Status: ERX | COMMUNITY
Start: 2022-11-22

## 2022-11-22 RX ADMIN — CLOBETASOL PROPIONATE: 0.5 SOLUTION TOPICAL at 00:00

## 2022-11-22 ASSESSMENT — LOCATION DETAILED DESCRIPTION DERM
LOCATION DETAILED: LEFT OCCIPITAL SCALP
LOCATION DETAILED: MID POSTERIOR NECK
LOCATION DETAILED: MID-OCCIPITAL SCALP
LOCATION DETAILED: LEFT INFERIOR OCCIPITAL SCALP

## 2022-11-22 ASSESSMENT — LOCATION SIMPLE DESCRIPTION DERM
LOCATION SIMPLE: POSTERIOR SCALP
LOCATION SIMPLE: POSTERIOR NECK

## 2022-11-22 ASSESSMENT — LOCATION ZONE DERM
LOCATION ZONE: NECK
LOCATION ZONE: SCALP

## 2022-11-22 NOTE — PROCEDURE: PRESCRIPTION MEDICATION MANAGEMENT
Initiate Treatment: Pt is scheduled for patch testing with allergy in 2 weeks\\nClobetasol solution- will follow allergy instructions regarding stopping prior to testing
Detail Level: Zone
Render In Strict Bullet Format?: No
Initiate Treatment: Clobetasol solution BID- await biopsy results

## 2022-11-22 NOTE — PROCEDURE: BIOPSY BY SHAVE METHOD
Detail Level: Detailed
Depth Of Biopsy: dermis
Was A Bandage Applied: Yes
Size Of Lesion In Cm: 0
Biopsy Type: H and E
Biopsy Method: Dermablade
Anesthesia Type: 1% lidocaine without epinephrine and a 1:10 solution of 8.4% sodium bicarbonate
Anesthesia Volume In Cc: 0.5
Hemostasis: Aluminum Chloride and Electrocautery
Wound Care: Petrolatum
Dressing: bandage
Destruction After The Procedure: No
Type Of Destruction Used: Curettage
Curettage Text: The wound bed was treated with curettage after the biopsy was performed.
Cryotherapy Text: The wound bed was treated with cryotherapy after the biopsy was performed.
Electrodesiccation Text: The wound bed was treated with electrodesiccation after the biopsy was performed.
Electrodesiccation And Curettage Text: The wound bed was treated with electrodesiccation and curettage after the biopsy was performed.
Silver Nitrate Text: The wound bed was treated with silver nitrate after the biopsy was performed.
Lab: [id number]
Lab Facility: [id number]
Consent: Written consent was obtained and risks were reviewed including but not limited to scarring, infection, bleeding, scabbing, incomplete removal, nerve damage and allergy to anesthesia.
Post-Care Instructions: I reviewed with the patient in detail post-care instructions. Patient is to keep the biopsy site dry overnight, and then apply bacitracin twice daily until healed. Patient may apply hydrogen peroxide soaks to remove any crusting.
Notification Instructions: Patient will be notified of biopsy results. However, patient instructed to call the office if not contacted within 2 weeks.
Billing Type: Third-Party Bill
Information: Selecting Yes will display possible errors in your note based on the variables you have selected. This validation is only offered as a suggestion for you. PLEASE NOTE THAT THE VALIDATION TEXT WILL BE REMOVED WHEN YOU FINALIZE YOUR NOTE. IF YOU WANT TO FAX A PRELIMINARY NOTE YOU WILL NEED TO TOGGLE THIS TO 'NO' IF YOU DO NOT WANT IT IN YOUR FAXED NOTE.

## 2022-11-22 NOTE — HPI: RASH
What Type Of Note Output Would You Prefer (Optional)?: Standard Output
How Severe Is Your Rash?: moderate
Is This A New Presentation, Or A Follow-Up?: Rash
Additional History: Has patch testing scheduled in December 2022

## 2022-11-30 ENCOUNTER — RX ONLY (OUTPATIENT)
Age: 57
Setting detail: RX ONLY
End: 2022-11-30

## 2022-11-30 RX ORDER — KETOCONAZOLE 20 MG/ML
SHAMPOO, SUSPENSION TOPICAL
Qty: 120 | Refills: 6 | Status: ERX | COMMUNITY
Start: 2022-11-30

## 2023-01-27 ENCOUNTER — OFFICE VISIT (OUTPATIENT)
Dept: RHEUMATOLOGY | Age: 58
End: 2023-01-27
Payer: COMMERCIAL

## 2023-01-27 VITALS
HEART RATE: 65 BPM | DIASTOLIC BLOOD PRESSURE: 79 MMHG | SYSTOLIC BLOOD PRESSURE: 120 MMHG | WEIGHT: 174.4 LBS | HEIGHT: 67 IN | BODY MASS INDEX: 27.37 KG/M2

## 2023-01-27 DIAGNOSIS — L40.9 PSORIASIS, UNSPECIFIED: Primary | ICD-10-CM

## 2023-01-27 DIAGNOSIS — M19.041 PRIMARY OSTEOARTHRITIS, RIGHT HAND: ICD-10-CM

## 2023-01-27 DIAGNOSIS — M19.042 PRIMARY OSTEOARTHRITIS, LEFT HAND: ICD-10-CM

## 2023-01-27 DIAGNOSIS — L40.9 PSORIASIS, UNSPECIFIED: ICD-10-CM

## 2023-01-27 DIAGNOSIS — R76.8 POSITIVE ANA (ANTINUCLEAR ANTIBODY): ICD-10-CM

## 2023-01-27 LAB — CRP SERPL-MCNC: <0.3 MG/DL (ref 0–0.9)

## 2023-01-27 PROCEDURE — 99214 OFFICE O/P EST MOD 30 MIN: CPT | Performed by: INTERNAL MEDICINE

## 2023-01-27 ASSESSMENT — ROUTINE ASSESSMENT OF PATIENT INDEX DATA (RAPID3)
ON A SCALE OF ONE TO TEN, HOW MUCH PAIN HAVE YOU HAD BECAUSE OF YOUR CONDITION OVER THE PAST WEEK?: 0
ON A SCALE OF ONE TO TEN, CONSIDERING ALL THE WAYS IN WHICH ILLNESS AND HEALTH CONDITIONS MAY AFFECT YOU AT THIS TIME, PLEASE INDICATE BELOW HOW YOU ARE DOING:: 0
WHEN YOU AWAKENED IN THE MORNING OVER THE LAST WEEK, PLEASE INDICATE THE AMOUNT OF TIME IT TAKES UNTIL YOU ARE AS LIMBER AS YOU WILL BE FOR THE DAY: < 10 MIN
ON A SCALE OF ONE TO TEN, HOW DIFFICULT WAS IT FOR YOU TO COMPLETE THE LISTED DAILY PHYSICAL TASKS OVER THE LAST WEEK: 0.0
ON A SCALE OF ONE TO TEN, HOW MUCH OF A PROBLEM HAS UNUSUAL FATIGUE OR TIREDNESS BEEN FOR YOU OVER THE PAST WEEK?: 0

## 2023-01-27 NOTE — PROGRESS NOTES
Krystle Romero M.D.  [facility] [address], [place] [facility]  Office : [phone], Fax: [phone] OFFICE VISIT NOTE  Date of Visit:  2023 8:39 AM    Patient Information:  Name:  Wilfredo Krause  :  1965  Age:  61 y.o. Gender:  female      Ms. Mando Faust is here today for follow-up of psoriasis, OA and 3 positive anti-RNP antibody titers [MC's]. Last visit:2022      History of Present Illness: On talking to the patient today she states that she went for physical therapy for her right shoulder and neck which helped to some extent and hence has not been taking the Mobic for pain relief. Currently she does complain of a cyst involving the right ring finger. Since she last saw me she has had a sinus infection and was treated with one round of an antibiotic for 10 days [doxycycline]. She states that she will be starting back on the allergy shots to treat her seasonal allergies. Her current joint complaints are minimal as mentioned below. Since the last visit, patient is feeling \"very good\". Pain: 0/10  Location:  Some lower back discomfort. Some left knee pain with swelling with no warmth and redness. Some pain in the ball of the right foot and pain on the dorsum with no swelling, warmth and redness. Quality:  Deep achy pain in the left knee. Modifying Factors:  Walking worsens the pain. Associated Symptoms:  No tingling, numbness or pain down the arms or legs. No UE or LE weakness.      DMARD/Biologic 2023   AM Stiffness < 10 min   Pain 0   Fatigue 0   MDHAQ 0.0   Patient Global Score 0     Last TB screen: Years ago  TB result: NA      Current dose of steroids: None  How long on current dose of steroids: NA  How long on continuous steroid therapy: NA      Past DMARDs, if applicable (methotrexate, plaquenil/hydroxychloroquine, sulfasalazine, Arava/leflunomide): None      Past biologics, if applicable (enbrel, humira, simponi, cimzia, [place], PARRA, remicade, simponi aria, actemra, rituximab, Alejandra Constance, stelara, cosentyx): None      Past NSAIDs, if applicable (motrin, aleve, naproxen, advil, ibuprofen, celebrex, voltaren/diclofenac, etc.): Celebrex in the past.  Aleve, Motrin/Ibuorfen, Naproxen PRN      Last BMD: NA  Past osteoporosis drugs, if applicable (fosamax, actonel, boniva, reclast, prolia, forteo): None      BMI: 27.31  Current exercise regimen, if any: [employer] 3 days a week. Walking dog every day 1 mile  Current vitamin D dose: None  Current calcium dose: None  Fractures since last visit, if any: None     The patient otherwise has no significant interval changes in health or medical history to report.      History Reviewed:    Past Medical History  Past Medical History:   Diagnosis Date    Allergic rhinitis     Arthritis     Asthma     Mixed connective tissue disease ([facility] [address]     Plantar fasciitis of left foot        Past Surgical History  Past Surgical History:   Procedure Laterality Date    APPENDECTOMY  1974    CHOLECYSTECTOMY      COLONOSCOPY  03/2018    WNL- Repeat in 5 years    HEENT      sinus surgery    OTHER SURGICAL HISTORY  12/2021    nasal prolaspe surgery     OVARY REMOVAL Left 1999    TONSILLECTOMY AND ADENOIDECTOMY  1966    TOTAL KNEE ARTHROPLASTY Right 11/20/2018    TYMPANOSTOMY TUBE PLACEMENT Right 06/2016    VASCULAR SURGERY Right 10/2017    Leg -cauterizing - veins    VEIN SURGERY Right 10/20/2017    Closed vein off       Family History  Family History   Problem Relation Age of Onset    Elevated Lipids Mother     Other Mother         mixed connective tissue disorder    Hypertension Father     Cancer Father         lung    Cancer Sister         liver    Cancer Sister         lymphoma     Elevated Lipids Sister     Hypertension Sister     Breast Cancer Sister     Lung Cancer Sister     Lung Cancer Sister     Hypertension Sister     High Cholesterol Sister     Cancer Brother Hypertension Brother     Elevated Lipids Brother     High Cholesterol Brother     Hypertension Brother     Colon Cancer Brother     High Cholesterol Brother     Hypertension Brother     No Known Problems Maternal Grandmother     No Known Problems Maternal Grandfather     No Known Problems Paternal Grandmother     No Known Problems Paternal Grandfather     Thyroid Disease Daughter         Hashimoto's thyroiditis       Social History  Social History     Socioeconomic History    Marital status:      Spouse name: None    Number of children: None    Years of education: None    Highest education level: None   Tobacco Use    Smoking status: Former     Types: Cigarettes     Quit date: 1992     Years since quittin.0    Smokeless tobacco: Never   Substance and Sexual Activity    Alcohol use: Yes     Alcohol/week: 2.0 standard drinks    Drug use: No   Social History Narrative    Denies physical or sexual abuse       Allergy:  Allergies   Allergen Reactions    Aspirin Other (See Comments)     Affects clotting of blood  Bruises easy/thins blood    Sulfa Antibiotics Swelling     face    Hydrocodone Rash    Tramadol Rash         Current Medications:  Outpatient Encounter Medications as of 2023   Medication Sig Dispense Refill    clobetasol (TEMOVATE) 0.05 % ointment Apply topically 2 times daily Apply topically 2 times daily. 60 g 1    BUDESONIDE NA ADD 10ML (ONE DOSE) OF MEDICATION TO 240ML OF SALINE IN SINUS RINSE BOTTLE.  IRRIGATE SINUSES WITH 120ML THROUGH EACH NOSTRIL TWICE DAILY      dextromethorphan-guaiFENesin (MUCINEX DM)  MG per extended release tablet Take 1 tablet by mouth daily      levocetirizine (XYZAL) 5 MG tablet Take by mouth      montelukast (SINGULAIR) 10 MG tablet Take 10 mg by mouth daily      nystatin (MYCOSTATIN) 802789 UNIT/GM powder Apply topically 4 times daily      [DISCONTINUED] clotrimazole-betamethasone (LOTRISONE) 1-0.05 % cream Apply topically 2 times daily (Patient not taking: Reported on 1/27/2023)       No facility-administered encounter medications on file as of 1/27/2023. REVIEW OF SYSTEMS: The following systems were reviewed with patient today and were negative except for the following (depicted with an \"X\"):        \"X\" General  \"X\" Head and Neck  \"X\" Heart and Breathing  \"X\" Gastrointestinal    Fever/chills   Hair loss   Shortness of breath   Upset stomach    Falls   Dry mouth   Coughing   Diarrhea / constipation    Wt loss   Mouth sores   Wheezing   Heartburn    Wt gain   Ringing ears   Chest pain   Dark or bloody stools    Night sweats   Diff. swallowing  X None of above   Nausea or vomiting   X None of above  X None of above     X None of above                \"X\" Skin  \"X\" Neurology  \"X\" Urinary/Gyn  \"X\" Other    Easy bruising   Numbness/ tingling   Female problems   Depression    Rashes   Weakness   Problems with urination   Feeling anxious    Sun sensitivity   Headaches  X None of above   Problems sleeping   X None of above  X None of above     X None of above          Physical Exam:  Blood pressure 120/79, pulse 65, height 5' 7\" (1.702 m), weight 174 lb 6.4 oz (79.1 kg). General:  Patient alert, cooperative and in no apparent distress. HEENT: Pupils equally reactive to light and accommodation, minimal scleral injection noted. Heart: Regular rate and rhythm, normal S1 and S2, no rubs or gallops. Lungs: Clear to auscultation bilaterally. Abdomen: Soft, nontender, no hepatosplenomegaly. Skin:  No rashes. No nail abnormalities. Neurologic:  Oriented, normal speech and affect. Normal gait. Extremities:  No edema in bilateral lower extremities with no cyanosis or clubbing. Muskoskeletal Exam:     I examined the shoulders, elbows, wrists, MCPs, PIPs, DIPs and knees bilaterally for strength, range of motion, deformity, tenderness, swelling, and synovitis.       The findings are:  No joint tenderness with no synovitis of the 2nd to 5th MCP, PIP or DIP joints. Noted ganglion cyst on the palmar aspect of the right middle finger which is nontender on palpation with no overlying warmth or redness. No synovitis with no tenderness of the wrists on the dorsum with no warmth or redness. Intact ROM of the wrists to flexion and extension. No tenderness of the elbows with no swelling, warmth or redness with intact ROM to flexion and extension. No tenderness of the shoulders anteriorly or superiorly with intact ROM to abduction and internal rotation. No tenderness of the C spine with no tenderness of the para spinal muscles of the neck. No tenderness of the T and L spine with no SI joint tenderness. No mid joint line tenderness of the knees with no swelling, warmth or redness. No tenderness with no synovitis of the ankles with no warmth or redness. No metatarsalgia with no synovitis of the 1st to 5th MTP joints of the feet with no warmth or redness. Patient otherwise has a normal joint exam without other evidence of joint tenderness, synovitis, warmth, erythema, decreased ROM, weakness or deformities. Radiology Reports Reviewed (if available):  Last 3 months  [unfilled]    Lab Reports Reviewed (if available): Last 3 months    No visits with results within 3 Month(s) from this visit.    Latest known visit with results is:   Nurse Only on 08/25/2022   Component Date Value Ref Range Status    Color, UA 08/25/2022 YELLOW/STRAW    Final    Color Reference Range: Straw, Yellow or Dark Yellow    Appearance 08/25/2022 CLEAR    Final    Specific Gravity, UA 08/25/2022 1.011  1.001 - 1.023   Final    pH, Urine 08/25/2022 7.5  5.0 - 9.0   Final    Protein, UA 08/25/2022 Negative  Negative mg/dL Final    Glucose, UA 08/25/2022 Negative  mg/dL Final    Ketones, Urine 08/25/2022 Negative  Negative mg/dL Final    Bilirubin Urine 08/25/2022 Negative  Negative   Final    Blood, Urine 08/25/2022 Negative  Negative   Final    Urobilinogen, Urine 08/25/2022 0.2  0.2 - 1.0 EU/dL Final    Nitrite, Urine 08/25/2022 Negative  Negative   Final    Leukocyte Esterase, Urine 08/25/2022 Negative  Negative   Final    TSH, 3RD GENERATION 08/25/2022 1.460  0.358 - 3.740 uIU/mL Final    Cholesterol, Total 08/25/2022 165  <200 MG/DL Final    Comment: Borderline High: 200-239 mg/dL  High: Greater than or equal to 240 mg/dL      Triglycerides 08/25/2022 91  35 - 150 MG/DL Final    Comment: Borderline High: 150-199 mg/dL, High: 200-499 mg/dL  Very High: Greater than or equal to 500 mg/dL      HDL 08/25/2022 48  40 - 60 MG/DL Final    LDL Calculated 08/25/2022 98.8  <100 MG/DL Final    Comment: Near Optimal: 100-129 mg/dL  Borderline High: 130-159, High: 160-189 mg/dL  Very High: Greater than or equal to 190 mg/dL      VLDL Cholesterol Calculated 08/25/2022 18.2  6.0 - 23.0 MG/DL Final    Chol/HDL Ratio 08/25/2022 3.4    Final    Sodium 08/25/2022 140  136 - 145 mmol/L Final    Potassium 08/25/2022 4.3  3.5 - 5.1 mmol/L Final    Chloride 08/25/2022 108 (A)  98 - 107 mmol/L Final    CO2 08/25/2022 28  21 - 32 mmol/L Final    Anion Gap 08/25/2022 4 (A)  7 - 16 mmol/L Final    Glucose 08/25/2022 89  65 - 100 mg/dL Final    BUN 08/25/2022 16  6 - 23 MG/DL Final    Creatinine 08/25/2022 0.70  0.6 - 1.0 MG/DL Final    GFR  08/25/2022 >60  >60 ml/min/1.73m2 Final    GFR Non- 08/25/2022 >60  >60 ml/min/1.73m2 Final    Comment:   Estimated GFR is calculated using the Modification of Diet in Renal Disease (MDRD) Study equation, reported for both  Americans (GFRAA) and non- Americans (GFRNA), and normalized to 1.73m2 body surface area. The physician must decide which value applies to the patient. The MDRD study equation should only be used in individuals age 25 or older.  It has not been validated for the following: pregnant women, patients with serious comorbid conditions,or on certain medications, or persons with extremes of body size, muscle mass, or nutritional status. Calcium 08/25/2022 9.2  8.3 - 10.4 MG/DL Final    Total Bilirubin 08/25/2022 0.5  0.2 - 1.1 MG/DL Final    ALT 08/25/2022 30  12 - 65 U/L Final    AST 08/25/2022 14 (A)  15 - 37 U/L Final    Alk Phosphatase 08/25/2022 88  50 - 136 U/L Final    Total Protein 08/25/2022 6.8  6.3 - 8.2 g/dL Final    Albumin 08/25/2022 3.6  3.5 - 5.0 g/dL Final    Globulin 08/25/2022 3.2  2.3 - 3.5 g/dL Final    Albumin/Globulin Ratio 08/25/2022 1.1 (A)  1.2 - 3.5   Final    WBC 08/25/2022 5.7  4.3 - 11.1 K/uL Final    RBC 08/25/2022 4.75  4.05 - 5.2 M/uL Final    Hemoglobin 08/25/2022 14.5  11.7 - 15.4 g/dL Final    Hematocrit 08/25/2022 46.1  35.8 - 46.3 % Final    MCV 08/25/2022 97.1  79.6 - 97.8 FL Final    MCH 08/25/2022 30.5  26.1 - 32.9 PG Final    MCHC 08/25/2022 31.5  31.4 - 35.0 g/dL Final    RDW 08/25/2022 12.4  11.9 - 14.6 % Final    Platelets 71/50/8381 231  150 - 450 K/uL Final    MPV 08/25/2022 11.5  9.4 - 12.3 FL Final    nRBC 08/25/2022 0.00  0.0 - 0.2 K/uL Final    **Note: Absolute NRBC parameter is now reported with Hemogram**    Differential Type 08/25/2022 AUTOMATED    Final    Seg Neutrophils 08/25/2022 61  43 - 78 % Final    Lymphocytes 08/25/2022 29  13 - 44 % Final    Monocytes 08/25/2022 7  4.0 - 12.0 % Final    Eosinophils % 08/25/2022 2  0.5 - 7.8 % Final    Basophils 08/25/2022 1  0.0 - 2.0 % Final    Immature Granulocytes 08/25/2022 0  0.0 - 5.0 % Final    Segs Absolute 08/25/2022 3.5  1.7 - 8.2 K/UL Final    Absolute Lymph # 08/25/2022 1.7  0.5 - 4.6 K/UL Final    Absolute Mono # 08/25/2022 0.4  0.1 - 1.3 K/UL Final    Absolute Eos # 08/25/2022 0.1  0.0 - 0.8 K/UL Final    Basophils Absolute 08/25/2022 0.0  0.0 - 0.2 K/UL Final    Absolute Immature Granulocyte 08/25/2022 0.0  0.0 - 0.5 K/UL Final         The results above were reviewed and discussed with patient.          Assessment/Plan:   Milan Solorio is a 61 y.o. female who presents with:     Psoriasis, unspecified: With regard to her psoriasis as she does not have active synovitis on exam today I did not feel the need to start the patient on an oral DMARD or give her information on the same. I did proceed with checking a CRP today and do plan on reviewing those results with her on her follow-up visit with me. -     C-Reactive Protein; Future    Positive MC (antinuclear antibody): Since she was found to have 3 positive anti-RNP antibody titers but does not have the presence of active synovitis on exam today I did not feel the need to start the patient on an oral DMARD or give her information on the same. I will continue to monitor her for now. Primary osteoarthritis, right hand: She was instructed to use the meloxicam as needed to treat the pain involving her hands. Primary osteoarthritis, left hand: Same as mentioned above. Disease activity plan:  As stated above. Steroid management plan:  As stated above, if applicable. Pain management plan:  As stated above, if applicable. Weight management plan:  Weight loss through diet and exercise is always encouraged    Disease prognosis: Good    I appreciate the opportunity to continue to participate in the care of this patient. Follow-up and Dispositions    Return in about 1 year (around 1/27/2024). Electronically signed by:  Dong Jenkins MD      This note was dictated using dragon voice recognition software.   It has been proofread, but there may still exist voice recognition errors that the author did not detect.                --------------------------------------------------------------------------------------------------------------------------------------------------------------------------------------------------------------------------------

## 2023-02-14 DIAGNOSIS — M19.041 PRIMARY OSTEOARTHRITIS, RIGHT HAND: ICD-10-CM

## 2023-05-10 NOTE — PROGRESS NOTES
HPI    Bradford Perez is a 61 y.o. female seen for annual GYN exam.  She thinks she may be getting a yeast infection. She is having some itching inside and outside. Past Medical History, Past Surgical History, Family history, Social History, and Medications were all reviewed with the patient today and updated as necessary. Current Outpatient Medications   Medication Sig    ketoconazole (NIZORAL) 2 % shampoo LATHER INTO SCALP AND LET IT SIT FOR 5-10 MINUTES THEN RINSE, DO THIS 3-4 TIMES A WEEK.    magnesium (MAGNESIUM-OXIDE) 250 MG TABS tablet Take 1 tablet by mouth daily    loratadine (CLARITIN) 10 MG capsule Take 1 capsule by mouth daily    estradiol (ESTRACE VAGINAL) 0.1 MG/GM vaginal cream Place 1 g vaginally Twice a Week    fluconazole (DIFLUCAN) 150 MG tablet 1 tablet now and repeat dose in 4 days    clobetasol (TEMOVATE) 0.05 % ointment Apply topically 2 times daily Apply topically 2 times daily. BUDESONIDE NA ADD 10ML (ONE DOSE) OF MEDICATION TO 240ML OF SALINE IN SINUS RINSE BOTTLE. IRRIGATE SINUSES WITH 120ML THROUGH EACH NOSTRIL TWICE DAILY    dextromethorphan-guaiFENesin (MUCINEX DM)  MG per extended release tablet Take 1 tablet by mouth daily    montelukast (SINGULAIR) 10 MG tablet Take 1 tablet by mouth daily    nystatin (MYCOSTATIN) 185139 UNIT/GM powder Apply topically 4 times daily     No current facility-administered medications for this visit.      Allergies   Allergen Reactions    Aspirin Other (See Comments)     Affects clotting of blood  Bruises easy/thins blood    Sulfa Antibiotics Swelling     face    Hydrocodone Rash    Tramadol Rash     Past Medical History:   Diagnosis Date    Allergic rhinitis     Arthritis     Asthma     Mixed connective tissue disease ([address]     Plantar fasciitis of left foot      Past Surgical History:   Procedure Laterality Date    APPENDECTOMY  1974    CHOLECYSTECTOMY      COLONOSCOPY  03/2018    WNL- Repeat in 5 years    HEENT      sinus

## 2023-05-11 ENCOUNTER — OFFICE VISIT (OUTPATIENT)
Dept: GYNECOLOGY | Age: 58
End: 2023-05-11
Payer: COMMERCIAL

## 2023-05-11 VITALS
WEIGHT: 171 LBS | SYSTOLIC BLOOD PRESSURE: 108 MMHG | BODY MASS INDEX: 26.84 KG/M2 | DIASTOLIC BLOOD PRESSURE: 70 MMHG | HEIGHT: 67 IN

## 2023-05-11 DIAGNOSIS — Z12.4 SCREENING FOR MALIGNANT NEOPLASM OF CERVIX: ICD-10-CM

## 2023-05-11 DIAGNOSIS — N95.2 VAGINAL ATROPHY: ICD-10-CM

## 2023-05-11 DIAGNOSIS — Z12.31 VISIT FOR SCREENING MAMMOGRAM: ICD-10-CM

## 2023-05-11 DIAGNOSIS — B37.9 YEAST INFECTION: ICD-10-CM

## 2023-05-11 DIAGNOSIS — N89.8 VAGINAL ITCHING: ICD-10-CM

## 2023-05-11 DIAGNOSIS — Z01.419 WELL WOMAN EXAM: Primary | ICD-10-CM

## 2023-05-11 PROCEDURE — 99396 PREV VISIT EST AGE 40-64: CPT | Performed by: OBSTETRICS & GYNECOLOGY

## 2023-05-11 RX ORDER — KETOCONAZOLE 20 MG/ML
SHAMPOO TOPICAL
COMMUNITY
Start: 2023-04-23

## 2023-05-11 RX ORDER — FLUCONAZOLE 150 MG/1
TABLET ORAL
Qty: 2 TABLET | Refills: 3 | Status: SHIPPED | OUTPATIENT
Start: 2023-05-11

## 2023-05-11 RX ORDER — ESTRADIOL 0.1 MG/G
1 CREAM VAGINAL
Qty: 42.5 G | Refills: 5 | Status: SHIPPED | OUTPATIENT
Start: 2023-05-11

## 2023-05-11 RX ORDER — MULTIVITAMIN WITH IRON
250 TABLET ORAL DAILY
COMMUNITY

## 2023-05-11 RX ORDER — LORATADINE 10 MG/1
10 CAPSULE, LIQUID FILLED ORAL DAILY
COMMUNITY

## 2023-05-15 DIAGNOSIS — Z12.31 VISIT FOR SCREENING MAMMOGRAM: ICD-10-CM

## 2023-05-15 LAB
A VAGINAE DNA VAG QL NAA+PROBE: ABNORMAL SCORE
BVAB2 DNA VAG QL NAA+PROBE: ABNORMAL SCORE
C ALBICANS DNA VAG QL NAA+PROBE: NEGATIVE
C GLABRATA DNA VAG QL NAA+PROBE: NEGATIVE
MEGA1 DNA VAG QL NAA+PROBE: ABNORMAL SCORE
SPECIMEN SOURCE: ABNORMAL
T VAGINALIS RRNA SPEC QL NAA+PROBE: NEGATIVE

## 2023-05-16 ENCOUNTER — PATIENT MESSAGE (OUTPATIENT)
Dept: GYNECOLOGY | Age: 58
End: 2023-05-16

## 2023-05-19 LAB
CYTOLOGIST CVX/VAG CYTO: NORMAL
CYTOLOGY CVX/VAG DOC THIN PREP: NORMAL
HPV REFLEX: NORMAL
Lab: NORMAL
Lab: NORMAL
PATH REPORT.FINAL DX SPEC: NORMAL
STAT OF ADQ CVX/VAG CYTO-IMP: NORMAL

## 2023-05-23 ENCOUNTER — APPOINTMENT (RX ONLY)
Dept: URBAN - METROPOLITAN AREA CLINIC 23 | Facility: CLINIC | Age: 58
Setting detail: DERMATOLOGY
End: 2023-05-23

## 2023-05-23 DIAGNOSIS — L57.0 ACTINIC KERATOSIS: ICD-10-CM

## 2023-05-23 DIAGNOSIS — L71.8 OTHER ROSACEA: ICD-10-CM

## 2023-05-23 DIAGNOSIS — Z71.89 OTHER SPECIFIED COUNSELING: ICD-10-CM

## 2023-05-23 DIAGNOSIS — L57.8 OTHER SKIN CHANGES DUE TO CHRONIC EXPOSURE TO NONIONIZING RADIATION: ICD-10-CM

## 2023-05-23 DIAGNOSIS — D69.2 OTHER NONTHROMBOCYTOPENIC PURPURA: ICD-10-CM

## 2023-05-23 DIAGNOSIS — D485 NEOPLASM OF UNCERTAIN BEHAVIOR OF SKIN: ICD-10-CM

## 2023-05-23 DIAGNOSIS — L20.89 OTHER ATOPIC DERMATITIS: ICD-10-CM

## 2023-05-23 DIAGNOSIS — D22 MELANOCYTIC NEVI: ICD-10-CM

## 2023-05-23 PROBLEM — D22.72 MELANOCYTIC NEVI OF LEFT LOWER LIMB, INCLUDING HIP: Status: ACTIVE | Noted: 2023-05-23

## 2023-05-23 PROBLEM — L20.84 INTRINSIC (ALLERGIC) ECZEMA: Status: ACTIVE | Noted: 2023-05-23

## 2023-05-23 PROBLEM — D48.5 NEOPLASM OF UNCERTAIN BEHAVIOR OF SKIN: Status: ACTIVE | Noted: 2023-05-23

## 2023-05-23 PROBLEM — D22.5 MELANOCYTIC NEVI OF TRUNK: Status: ACTIVE | Noted: 2023-05-23

## 2023-05-23 PROCEDURE — ? TREATMENT REGIMEN

## 2023-05-23 PROCEDURE — ? COUNSELING

## 2023-05-23 PROCEDURE — 17000 DESTRUCT PREMALG LESION: CPT | Mod: 59

## 2023-05-23 PROCEDURE — ? LIQUID NITROGEN

## 2023-05-23 PROCEDURE — ? BIOPSY BY SHAVE METHOD

## 2023-05-23 PROCEDURE — 99214 OFFICE O/P EST MOD 30 MIN: CPT | Mod: 25

## 2023-05-23 PROCEDURE — ? OBSERVATION

## 2023-05-23 PROCEDURE — 11102 TANGNTL BX SKIN SINGLE LES: CPT

## 2023-05-23 PROCEDURE — ? PRESCRIPTION MEDICATION MANAGEMENT

## 2023-05-23 RX ORDER — METRONIDAZOLE 500 MG/1
500 TABLET ORAL 2 TIMES DAILY
Qty: 14 TABLET | Refills: 0 | Status: SHIPPED | OUTPATIENT
Start: 2023-05-23

## 2023-05-23 ASSESSMENT — LOCATION DETAILED DESCRIPTION DERM
LOCATION DETAILED: RIGHT MID-UPPER BACK
LOCATION DETAILED: STERNAL NOTCH
LOCATION DETAILED: LEFT MID-UPPER BACK
LOCATION DETAILED: LEFT MEDIAL DORSAL FOOT
LOCATION DETAILED: LEFT RIB CAGE
LOCATION DETAILED: RIGHT DISTAL DORSAL FOREARM
LOCATION DETAILED: RIGHT AREOLA
LOCATION DETAILED: RIGHT MEDIAL MALAR CHEEK

## 2023-05-23 ASSESSMENT — LOCATION SIMPLE DESCRIPTION DERM
LOCATION SIMPLE: LEFT FOOT
LOCATION SIMPLE: RIGHT BREAST
LOCATION SIMPLE: CHEST
LOCATION SIMPLE: RIGHT UPPER BACK
LOCATION SIMPLE: ABDOMEN
LOCATION SIMPLE: RIGHT CHEEK
LOCATION SIMPLE: RIGHT FOREARM
LOCATION SIMPLE: LEFT UPPER BACK

## 2023-05-23 ASSESSMENT — LOCATION ZONE DERM
LOCATION ZONE: ARM
LOCATION ZONE: TRUNK
LOCATION ZONE: FEET
LOCATION ZONE: FACE

## 2023-05-23 NOTE — PROCEDURE: PRESCRIPTION MEDICATION MANAGEMENT
Discontinue Regimen: Clobetasol
Initiate Treatment: Discussed atopic precautions \\nCotton bras\\nTAC cream BID for 2 weeks
Detail Level: Detailed
Plan: Discussed bx if not improving with TAC cream
Render In Strict Bullet Format?: No

## 2023-05-23 NOTE — TELEPHONE ENCOUNTER
From: LUISA ROTH  To: Darylene Liming  Sent: 5/16/2023 9:15 AM EDT  Subject: culture results    Below are culture results from Dr Bah Quiet:      Negative for yeast. Culture OK. Had some bacteria in the vagina but this would cause discharge with odor and not itching      Please let our office know if you have any questions about the results.    Kristin Woodward

## 2023-05-23 NOTE — PROCEDURE: LIQUID NITROGEN
Consent: The patient's consent was obtained including but not limited to risks of crusting, scabbing, blistering, scarring, darker or lighter pigmentary change, recurrence, incomplete removal and infection.
Show Aperture Variable?: Yes
Post-Care Instructions: I reviewed with the patient in detail post-care instructions. Patient is to wear sunprotection, and avoid picking at any of the treated lesions. Pt may apply Vaseline to crusted or scabbing areas.
Duration Of Freeze Thaw-Cycle (Seconds): 0
Detail Level: Detailed
Number Of Freeze-Thaw Cycles: 1 freeze-thaw cycle
Render Post-Care Instructions In Note?: no

## 2023-08-30 DIAGNOSIS — Z00.00 ENCOUNTER FOR GENERAL ADULT MEDICAL EXAMINATION WITHOUT ABNORMAL FINDINGS: Primary | ICD-10-CM

## 2023-08-30 DIAGNOSIS — E78.00 ELEVATED LDL CHOLESTEROL LEVEL: ICD-10-CM

## 2023-08-31 ENCOUNTER — NURSE ONLY (OUTPATIENT)
Dept: FAMILY MEDICINE CLINIC | Facility: CLINIC | Age: 58
End: 2023-08-31

## 2023-08-31 DIAGNOSIS — E78.00 ELEVATED LDL CHOLESTEROL LEVEL: ICD-10-CM

## 2023-08-31 DIAGNOSIS — Z00.00 ENCOUNTER FOR GENERAL ADULT MEDICAL EXAMINATION WITHOUT ABNORMAL FINDINGS: ICD-10-CM

## 2023-08-31 LAB
ALBUMIN SERPL-MCNC: 3.8 G/DL (ref 3.5–5)
ALBUMIN/GLOB SERPL: 1.2 (ref 0.4–1.6)
ALP SERPL-CCNC: 98 U/L (ref 50–136)
ALT SERPL-CCNC: 35 U/L (ref 12–65)
ANION GAP SERPL CALC-SCNC: 5 MMOL/L (ref 2–11)
APPEARANCE UR: CLEAR
AST SERPL-CCNC: 23 U/L (ref 15–37)
BACTERIA URNS QL MICRO: NEGATIVE /HPF
BASOPHILS # BLD: 0 K/UL (ref 0–0.2)
BASOPHILS NFR BLD: 1 % (ref 0–2)
BILIRUB SERPL-MCNC: 0.4 MG/DL (ref 0.2–1.1)
BILIRUB UR QL: NEGATIVE
BUN SERPL-MCNC: 20 MG/DL (ref 6–23)
CALCIUM SERPL-MCNC: 9.6 MG/DL (ref 8.3–10.4)
CASTS URNS QL MICRO: NORMAL /LPF (ref 0–2)
CHLORIDE SERPL-SCNC: 107 MMOL/L (ref 101–110)
CHOLEST SERPL-MCNC: 178 MG/DL
CO2 SERPL-SCNC: 28 MMOL/L (ref 21–32)
COLOR UR: NORMAL
CREAT SERPL-MCNC: 0.8 MG/DL (ref 0.6–1)
DIFFERENTIAL METHOD BLD: NORMAL
EOSINOPHIL # BLD: 0.1 K/UL (ref 0–0.8)
EOSINOPHIL NFR BLD: 2 % (ref 0.5–7.8)
EPI CELLS #/AREA URNS HPF: NORMAL /HPF (ref 0–5)
ERYTHROCYTE [DISTWIDTH] IN BLOOD BY AUTOMATED COUNT: 12.8 % (ref 11.9–14.6)
GLOBULIN SER CALC-MCNC: 3.3 G/DL (ref 2.8–4.5)
GLUCOSE SERPL-MCNC: 87 MG/DL (ref 65–100)
GLUCOSE UR STRIP.AUTO-MCNC: NEGATIVE MG/DL
HCT VFR BLD AUTO: 45.5 % (ref 35.8–46.3)
HDLC SERPL-MCNC: 64 MG/DL (ref 40–60)
HDLC SERPL: 2.8
HGB BLD-MCNC: 14.7 G/DL (ref 11.7–15.4)
HGB UR QL STRIP: NEGATIVE
IMM GRANULOCYTES # BLD AUTO: 0 K/UL (ref 0–0.5)
IMM GRANULOCYTES NFR BLD AUTO: 0 % (ref 0–5)
KETONES UR QL STRIP.AUTO: NEGATIVE MG/DL
LDLC SERPL CALC-MCNC: 93.4 MG/DL
LEUKOCYTE ESTERASE UR QL STRIP.AUTO: NEGATIVE
LYMPHOCYTES # BLD: 1.7 K/UL (ref 0.5–4.6)
LYMPHOCYTES NFR BLD: 29 % (ref 13–44)
MCH RBC QN AUTO: 30.9 PG (ref 26.1–32.9)
MCHC RBC AUTO-ENTMCNC: 32.3 G/DL (ref 31.4–35)
MCV RBC AUTO: 95.6 FL (ref 82–102)
MONOCYTES # BLD: 0.5 K/UL (ref 0.1–1.3)
MONOCYTES NFR BLD: 9 % (ref 4–12)
MUCOUS THREADS URNS QL MICRO: 0 /LPF
NEUTS SEG # BLD: 3.5 K/UL (ref 1.7–8.2)
NEUTS SEG NFR BLD: 59 % (ref 43–78)
NITRITE UR QL STRIP.AUTO: NEGATIVE
NRBC # BLD: 0 K/UL (ref 0–0.2)
PH UR STRIP: 7.5 (ref 5–9)
PLATELET # BLD AUTO: 259 K/UL (ref 150–450)
PMV BLD AUTO: 11.5 FL (ref 9.4–12.3)
POTASSIUM SERPL-SCNC: 4.4 MMOL/L (ref 3.5–5.1)
PROT SERPL-MCNC: 7.1 G/DL (ref 6.3–8.2)
PROT UR STRIP-MCNC: NEGATIVE MG/DL
RBC # BLD AUTO: 4.76 M/UL (ref 4.05–5.2)
RBC #/AREA URNS HPF: NORMAL /HPF (ref 0–5)
SODIUM SERPL-SCNC: 140 MMOL/L (ref 133–143)
SP GR UR REFRACTOMETRY: 1.01 (ref 1–1.02)
TRIGL SERPL-MCNC: 103 MG/DL (ref 35–150)
TSH W FREE THYROID IF ABNORMAL: 2.12 UIU/ML (ref 0.36–3.74)
URINE CULTURE IF INDICATED: NORMAL
UROBILINOGEN UR QL STRIP.AUTO: 0.2 EU/DL (ref 0.2–1)
VLDLC SERPL CALC-MCNC: 20.6 MG/DL (ref 6–23)
WBC # BLD AUTO: 5.9 K/UL (ref 4.3–11.1)
WBC URNS QL MICRO: NORMAL /HPF (ref 0–4)

## 2023-09-05 ASSESSMENT — PATIENT HEALTH QUESTIONNAIRE - PHQ9
SUM OF ALL RESPONSES TO PHQ QUESTIONS 1-9: 0
SUM OF ALL RESPONSES TO PHQ9 QUESTIONS 1 & 2: 0
1. LITTLE INTEREST OR PLEASURE IN DOING THINGS: NOT AT ALL
2. FEELING DOWN, DEPRESSED OR HOPELESS: NOT AT ALL
1. LITTLE INTEREST OR PLEASURE IN DOING THINGS: 0
SUM OF ALL RESPONSES TO PHQ9 QUESTIONS 1 & 2: 0
2. FEELING DOWN, DEPRESSED OR HOPELESS: 0

## 2023-09-07 ENCOUNTER — OFFICE VISIT (OUTPATIENT)
Dept: FAMILY MEDICINE CLINIC | Facility: CLINIC | Age: 58
End: 2023-09-07
Payer: COMMERCIAL

## 2023-09-07 VITALS
HEIGHT: 68 IN | OXYGEN SATURATION: 96 % | TEMPERATURE: 97.2 F | SYSTOLIC BLOOD PRESSURE: 112 MMHG | HEART RATE: 66 BPM | WEIGHT: 178 LBS | DIASTOLIC BLOOD PRESSURE: 68 MMHG | BODY MASS INDEX: 26.98 KG/M2

## 2023-09-07 DIAGNOSIS — L30.9 ECZEMA, UNSPECIFIED TYPE: ICD-10-CM

## 2023-09-07 DIAGNOSIS — E28.39 ESTROGEN DEFICIENCY: ICD-10-CM

## 2023-09-07 DIAGNOSIS — Z00.00 PREVENTATIVE HEALTH CARE: Primary | ICD-10-CM

## 2023-09-07 DIAGNOSIS — J45.20 MILD INTERMITTENT ASTHMA WITHOUT COMPLICATION: ICD-10-CM

## 2023-09-07 DIAGNOSIS — E06.3 HASHIMOTO'S THYROIDITIS: ICD-10-CM

## 2023-09-07 DIAGNOSIS — E55.9 VITAMIN D INSUFFICIENCY: ICD-10-CM

## 2023-09-07 PROCEDURE — 99396 PREV VISIT EST AGE 40-64: CPT | Performed by: FAMILY MEDICINE

## 2023-09-07 RX ORDER — CLOBETASOL PROPIONATE 0.5 MG/G
OINTMENT TOPICAL 2 TIMES DAILY
Qty: 60 G | Refills: 1 | Status: SHIPPED | OUTPATIENT
Start: 2023-09-07

## 2023-09-07 RX ORDER — CALCIUM CARBONATE 300MG(750)
TABLET,CHEWABLE ORAL
COMMUNITY

## 2023-09-07 ASSESSMENT — ENCOUNTER SYMPTOMS
EYE DISCHARGE: 0
DIARRHEA: 0
SHORTNESS OF BREATH: 0
CONSTIPATION: 0
COUGH: 0
SINUS PAIN: 0
CHEST TIGHTNESS: 0
ABDOMINAL PAIN: 0

## 2023-10-05 ENCOUNTER — HOSPITAL ENCOUNTER (OUTPATIENT)
Dept: MAMMOGRAPHY | Age: 58
Discharge: HOME | End: 2023-10-05
Attending: FAMILY MEDICINE
Payer: COMMERCIAL

## 2023-10-05 DIAGNOSIS — E06.3 HASHIMOTO'S THYROIDITIS: ICD-10-CM

## 2023-10-05 DIAGNOSIS — E28.39 ESTROGEN DEFICIENCY: ICD-10-CM

## 2023-10-05 DIAGNOSIS — E55.9 VITAMIN D INSUFFICIENCY: ICD-10-CM

## 2023-10-05 PROCEDURE — 77080 DXA BONE DENSITY AXIAL: CPT

## 2024-01-24 ENCOUNTER — TELEPHONE (OUTPATIENT)
Dept: FAMILY MEDICINE CLINIC | Facility: CLINIC | Age: 59
End: 2024-01-24

## 2024-01-25 ENCOUNTER — TELEPHONE (OUTPATIENT)
Dept: FAMILY MEDICINE CLINIC | Facility: CLINIC | Age: 59
End: 2024-01-25

## 2024-01-25 NOTE — TELEPHONE ENCOUNTER
Pt called to reschedule cpx that was 9/17, Pt only wants to see Dr villanueva. Told pt next available is not til dec 19th, pt refused and stated that dr villanueva said if pt has a hard time getting in to message her so she can get in earlier. What should I tell the pt?

## 2024-01-26 ENCOUNTER — OFFICE VISIT (OUTPATIENT)
Dept: RHEUMATOLOGY | Age: 59
End: 2024-01-26
Payer: COMMERCIAL

## 2024-01-26 VITALS
HEART RATE: 71 BPM | SYSTOLIC BLOOD PRESSURE: 127 MMHG | WEIGHT: 185 LBS | BODY MASS INDEX: 28.04 KG/M2 | DIASTOLIC BLOOD PRESSURE: 84 MMHG | HEIGHT: 68 IN

## 2024-01-26 DIAGNOSIS — M19.041 PRIMARY OSTEOARTHRITIS, RIGHT HAND: Primary | ICD-10-CM

## 2024-01-26 DIAGNOSIS — R76.8 POSITIVE ANA (ANTINUCLEAR ANTIBODY): ICD-10-CM

## 2024-01-26 DIAGNOSIS — L40.9 PSORIASIS, UNSPECIFIED: ICD-10-CM

## 2024-01-26 PROCEDURE — 99214 OFFICE O/P EST MOD 30 MIN: CPT | Performed by: INTERNAL MEDICINE

## 2024-01-26 RX ORDER — CETIRIZINE HYDROCHLORIDE 10 MG/1
10 TABLET ORAL DAILY
COMMUNITY

## 2024-01-26 RX ORDER — ANTIOX #8/OM3/DHA/EPA/LUT/ZEAX 250-2.5 MG
1 CAPSULE ORAL DAILY
COMMUNITY

## 2024-01-26 RX ORDER — NAPROXEN SODIUM 220 MG
220 TABLET ORAL AS NEEDED
COMMUNITY
End: 2024-01-26 | Stop reason: ALTCHOICE

## 2024-01-26 RX ORDER — IBUPROFEN 200 MG
200 TABLET ORAL AS NEEDED
COMMUNITY
End: 2024-01-26 | Stop reason: ALTCHOICE

## 2024-01-26 RX ORDER — ASCORBIC ACID 500 MG
1000 TABLET ORAL DAILY
COMMUNITY

## 2024-01-26 ASSESSMENT — ROUTINE ASSESSMENT OF PATIENT INDEX DATA (RAPID3)
ON A SCALE OF ONE TO TEN, HOW DIFFICULT WAS IT FOR YOU TO COMPLETE THE LISTED DAILY PHYSICAL TASKS OVER THE LAST WEEK: 0.0
ON A SCALE OF ONE TO TEN, HOW MUCH OF A PROBLEM HAS UNUSUAL FATIGUE OR TIREDNESS BEEN FOR YOU OVER THE PAST WEEK?: 0
ON A SCALE OF ONE TO TEN, HOW MUCH PAIN HAVE YOU HAD BECAUSE OF YOUR CONDITION OVER THE PAST WEEK?: 0
ON A SCALE OF ONE TO TEN, CONSIDERING ALL THE WAYS IN WHICH ILLNESS AND HEALTH CONDITIONS MAY AFFECT YOU AT THIS TIME, PLEASE INDICATE BELOW HOW YOU ARE DOING:: 0
WHEN YOU AWAKENED IN THE MORNING OVER THE LAST WEEK, PLEASE INDICATE THE AMOUNT OF TIME IT TAKES UNTIL YOU ARE AS LIMBER AS YOU WILL BE FOR THE DAY: < 10 MIN

## 2024-01-26 ASSESSMENT — JOINT PAIN
TOTAL NUMBER OF TENDER JOINTS: 2
TOTAL NUMBER OF SWOLLEN JOINTS: 1

## 2024-01-26 NOTE — PROGRESS NOTES
Health  Organization meta-analysis fracture risk calculator (FRAX) analysis was  performed for 10 year fracture risk probability assessment    FINDINGS:    LUMBAR SPINE:    Bone mineral density (gm/cm2):  1.141   T-score:  -0.4  Z-score:  0.1     RIGHT TOTAL HIP:    Bone mineral density (gm/cm2):  0.809  T-score:  -1.6  Z-score:  -1.1    LEFT TOTAL HIP:    Bone mineral density (gm/cm2):  0.907  T-score:  -0.8  Z-score:  -0.3    RIGHT FEMORAL NECK:    Bone mineral density (gm/cm2):  0.784  T-score:  -1.8  Z-score:  -1.0     LEFT FEMORAL NECK:    Bone mineral density (gm/cm2):  0.825  T-score:  -1.5  Z-score:  -0.7   Impression: Using the World Health Organization criteria, the bone mineral  density is osteopenia.    10 year probability of major osteoporotic fracture:  8.6%  10 year probability of hip fracture:  0.9%    Recommendations:  Therapy recommendations need to be tailored to each individual patient. Using  the World Health Organization (WHO) FRAX absolute fracture algorithm, the  National Osteoporosis Foundation recommends beginning pharmacological therapy in  postmenopausal women and men over the age of 50 with a 10 year probability of a  hip fracture of >3% OR with the 10 year probability of a major osteoporotic  fracture of >20%.         Lab Reports Reviewed (if available): Last 3 months    No visits with results within 3 Month(s) from this visit.   Latest known visit with results is:   Nurse Only on 08/31/2023   Component Date Value Ref Range Status    TSH w Free Thyroid if Abnormal 08/31/2023 2.12  0.358 - 3.740 UIU/ML Final    Color, UA 08/31/2023 YELLOW/STRAW    Final    Color Reference Range: Straw, Yellow or Dark Yellow    Appearance 08/31/2023 CLEAR    Final    Specific Gravity, UA 08/31/2023 1.015  1.001 - 1.023   Final    pH, Urine 08/31/2023 7.5  5.0 - 9.0   Final    Protein, UA 08/31/2023 Negative  Negative mg/dL Final    Glucose, UA 08/31/2023 Negative  mg/dL Final    Ketones, Urine 08/31/2023

## 2024-03-06 ENCOUNTER — OFFICE VISIT (OUTPATIENT)
Dept: FAMILY MEDICINE CLINIC | Facility: CLINIC | Age: 59
End: 2024-03-06
Payer: COMMERCIAL

## 2024-03-06 VITALS
WEIGHT: 186.4 LBS | BODY MASS INDEX: 28.25 KG/M2 | DIASTOLIC BLOOD PRESSURE: 78 MMHG | SYSTOLIC BLOOD PRESSURE: 112 MMHG | HEART RATE: 82 BPM | OXYGEN SATURATION: 98 % | TEMPERATURE: 97.2 F | HEIGHT: 68 IN

## 2024-03-06 DIAGNOSIS — R23.3 EASY BRUISING: Primary | ICD-10-CM

## 2024-03-06 LAB
APTT PPP: 37.6 SEC (ref 23.3–37.4)
BASOPHILS # BLD: 0.1 K/UL (ref 0–0.2)
BASOPHILS NFR BLD: 1 % (ref 0–2)
DIFFERENTIAL METHOD BLD: NORMAL
EOSINOPHIL # BLD: 0.1 K/UL (ref 0–0.8)
EOSINOPHIL NFR BLD: 2 % (ref 0.5–7.8)
ERYTHROCYTE [DISTWIDTH] IN BLOOD BY AUTOMATED COUNT: 12.5 % (ref 11.9–14.6)
HCT VFR BLD AUTO: 44.2 % (ref 35.8–46.3)
HGB BLD-MCNC: 14.3 G/DL (ref 11.7–15.4)
IMM GRANULOCYTES NFR BLD AUTO: 0 % (ref 0–5)
INR PPP: 0.9
LYMPHOCYTES # BLD: 1.6 K/UL (ref 0.5–4.6)
LYMPHOCYTES NFR BLD: 22 % (ref 13–44)
MCHC RBC AUTO-ENTMCNC: 32.4 G/DL (ref 31.4–35)
MCV RBC AUTO: 95.7 FL (ref 82–102)
MONOCYTES # BLD: 0.4 K/UL (ref 0.1–1.3)
MONOCYTES NFR BLD: 6 % (ref 4–12)
NEUTS SEG # BLD: 5 K/UL (ref 1.7–8.2)
NEUTS SEG NFR BLD: 69 % (ref 43–78)
NRBC # BLD: 0 K/UL (ref 0–0.2)
PLATELET # BLD AUTO: 287 K/UL (ref 150–450)
PMV BLD AUTO: 10.8 FL (ref 9.4–12.3)
PROTHROMBIN TIME: 13 SEC (ref 11.3–14.9)
RBC # BLD AUTO: 4.62 M/UL (ref 4.05–5.2)
WBC # BLD AUTO: 7.2 K/UL (ref 4.3–11.1)

## 2024-03-06 PROCEDURE — 99213 OFFICE O/P EST LOW 20 MIN: CPT | Performed by: FAMILY MEDICINE

## 2024-03-06 SDOH — ECONOMIC STABILITY: FOOD INSECURITY: WITHIN THE PAST 12 MONTHS, THE FOOD YOU BOUGHT JUST DIDN'T LAST AND YOU DIDN'T HAVE MONEY TO GET MORE.: NEVER TRUE

## 2024-03-06 SDOH — ECONOMIC STABILITY: HOUSING INSECURITY
IN THE LAST 12 MONTHS, WAS THERE A TIME WHEN YOU DID NOT HAVE A STEADY PLACE TO SLEEP OR SLEPT IN A SHELTER (INCLUDING NOW)?: NO

## 2024-03-06 SDOH — ECONOMIC STABILITY: INCOME INSECURITY: HOW HARD IS IT FOR YOU TO PAY FOR THE VERY BASICS LIKE FOOD, HOUSING, MEDICAL CARE, AND HEATING?: NOT HARD AT ALL

## 2024-03-06 SDOH — ECONOMIC STABILITY: FOOD INSECURITY: WITHIN THE PAST 12 MONTHS, YOU WORRIED THAT YOUR FOOD WOULD RUN OUT BEFORE YOU GOT MONEY TO BUY MORE.: NEVER TRUE

## 2024-03-06 ASSESSMENT — PATIENT HEALTH QUESTIONNAIRE - PHQ9
SUM OF ALL RESPONSES TO PHQ QUESTIONS 1-9: 0
SUM OF ALL RESPONSES TO PHQ QUESTIONS 1-9: 0
2. FEELING DOWN, DEPRESSED OR HOPELESS: 0
SUM OF ALL RESPONSES TO PHQ QUESTIONS 1-9: 0
SUM OF ALL RESPONSES TO PHQ QUESTIONS 1-9: 0
SUM OF ALL RESPONSES TO PHQ9 QUESTIONS 1 & 2: 0
1. LITTLE INTEREST OR PLEASURE IN DOING THINGS: 0

## 2024-03-06 ASSESSMENT — ENCOUNTER SYMPTOMS
SHORTNESS OF BREATH: 0
CHEST TIGHTNESS: 0
SINUS PAIN: 0
EYE DISCHARGE: 0
COUGH: 0
ABDOMINAL PAIN: 0
DIARRHEA: 0
CONSTIPATION: 0

## 2024-03-06 NOTE — PROGRESS NOTES
Rocio Dunn is a 58 y.o. female who presents with   Chief Complaint   Patient presents with    Bleeding/Bruising     Easily bruised on arms and hands. Tested many years ago for von willebrand factor - negative.        History of Present Illness    Pt has noticed easy bruising over past 8-10 years.  Easy tears.  Most on arms.  No heavy periods in childhood or abnormal bleeding with surgery.  Brother  with cerebral aneurysm.  FH Dermatomyositis and mixed connective tissue disease.  No significant ETOH.     Review of Systems  Review of Systems   Constitutional:  Negative for appetite change, fatigue and fever.   HENT:  Negative for congestion, ear pain and sinus pain.    Eyes:  Negative for discharge.   Respiratory:  Negative for cough, chest tightness and shortness of breath.    Cardiovascular:  Negative for chest pain, palpitations and leg swelling.   Gastrointestinal:  Negative for abdominal pain, constipation and diarrhea.   Genitourinary:  Negative for dysuria.   Musculoskeletal:  Negative for joint swelling.   Skin:  Negative for rash.   Neurological:  Negative for headaches.   Hematological:  Negative for adenopathy.   Psychiatric/Behavioral:  Negative for dysphoric mood. The patient is not nervous/anxious.         Medications  Current Outpatient Medications   Medication Sig Dispense Refill    UNABLE TO FIND Take 3 tablets by mouth daily Calcium 905 mg, Vitamin D 1000 units per 3 tablets      Pseudoephedrine-guaiFENesin (MUCINEX D PO) Take 1 tablet by mouth daily      cetirizine (ZYRTEC) 10 MG tablet Take 1 tablet by mouth daily      Multiple Vitamins-Minerals (PRESERVISION AREDS 2) CAPS Take 1 capsule by mouth daily      vitamin C (ASCORBIC ACID) 500 MG tablet Take 2 tablets by mouth daily      Magnesium 400 MG TABS Take 2 tablets by mouth daily      B Complex Vitamins (VITAMIN B COMPLEX PO) Take by mouth      clobetasol (TEMOVATE) 0.05 % ointment Apply topically 2 times daily Apply topically 2

## 2024-03-08 DIAGNOSIS — M25.50 ARTHRALGIA, UNSPECIFIED JOINT: Primary | ICD-10-CM

## 2024-03-09 LAB — VWF:RCO ACT/NOR PPP PL AGG: 123 % (ref 50–200)

## 2024-03-12 ENCOUNTER — NURSE ONLY (OUTPATIENT)
Dept: FAMILY MEDICINE CLINIC | Facility: CLINIC | Age: 59
End: 2024-03-12

## 2024-03-12 DIAGNOSIS — M25.50 ARTHRALGIA, UNSPECIFIED JOINT: ICD-10-CM

## 2024-03-12 LAB — CRP SERPL-MCNC: <0.3 MG/DL (ref 0–0.9)

## 2024-03-14 DIAGNOSIS — R76.8 ANTI-RNP ANTIBODIES PRESENT: ICD-10-CM

## 2024-03-14 DIAGNOSIS — R76.8 POSITIVE ANA (ANTINUCLEAR ANTIBODY): Primary | ICD-10-CM

## 2024-03-14 LAB
ANA SER QL: POSITIVE
CENTROMERE B AB SER-ACNC: <0.2 AI (ref 0–0.9)
CHROMATIN AB SERPL-ACNC: <0.2 AI (ref 0–0.9)
DSDNA AB SER-ACNC: <1 IU/ML (ref 0–9)
ENA JO1 AB SER-ACNC: <0.2 AI (ref 0–0.9)
ENA RNP AB SER-ACNC: >8 AI (ref 0–0.9)
ENA SCL70 AB SER-ACNC: <0.2 AI (ref 0–0.9)
ENA SM AB SER-ACNC: <0.2 AI (ref 0–0.9)
ENA SS-A AB SER-ACNC: <0.2 AI (ref 0–0.9)
ENA SS-B AB SER-ACNC: <0.2 AI (ref 0–0.9)
Lab: ABNORMAL

## 2024-05-15 NOTE — PROGRESS NOTES
[facility] 58 [facility] for annual GYN exam.    Past Medical History, Past Surgical History, Family history, Social History, and Medications were all reviewed with the patient today and updated as necessary.     Current Outpatient Medications   Medication Sig    estradiol (ESTRACE VAGINAL) 0.1 MG/GM vaginal cream Place 1 g vaginally Twice a Week    UNABLE TO FIND Take 3 tablets by mouth daily Calcium 905 mg, Vitamin D 1000 units per 3 tablets    Pseudoephedrine-guaiFENesin (MUCINEX D PO) Take 1 tablet by mouth daily    cetirizine (ZYRTEC) 10 MG tablet Take 1 tablet by mouth daily    Multiple Vitamins-Minerals (PRESERVISION AREDS 2) CAPS Take 1 capsule by mouth daily    vitamin C (ASCORBIC ACID) 500 MG tablet Take 2 tablets by mouth daily    Magnesium 400 MG TABS Take 2 tablets by mouth daily    B Complex Vitamins (VITAMIN B COMPLEX PO) Take by mouth    clobetasol (TEMOVATE) 0.05 % ointment Apply topically 2 times daily Apply topically 2 times daily.    ketoconazole (NIZORAL) 2 % shampoo LATHER INTO SCALP AND LET IT SIT FOR 5-10 MINUTES THEN RINSE, DO THIS 3-4 TIMES A WEEK.    BUDESONIDE NA ADD 10ML (ONE DOSE) OF MEDICATION TO 240ML OF SALINE IN SINUS RINSE BOTTLE. IRRIGATE SINUSES WITH 120ML THROUGH EACH NOSTRIL TWICE DAILY    montelukast (SINGULAIR) 10 MG tablet Take 1 tablet by mouth daily    nystatin (MYCOSTATIN) 412057 UNIT/GM powder Apply topically 4 times daily     No current facility-administered medications for this visit.     Allergies   Allergen Reactions    Aspirin Other (See Comments)     Affects clotting of blood  Bruises easy/thins blood    Sulfa Antibiotics Swelling     face    Hydrocodone Rash    Tramadol Rash     Past Medical History:   Diagnosis Date    Allergic rhinitis     Arthritis     Asthma     Mixed connective tissue disease (HCC)     Plantar fasciitis of left foot      Past Surgical History:   Procedure Laterality Date    APPENDECTOMY  1974    CHOLECYSTECTOMY

## 2024-05-16 ENCOUNTER — OFFICE VISIT (OUTPATIENT)
Dept: OBGYN CLINIC | Age: 59
End: 2024-05-16
Payer: COMMERCIAL

## 2024-05-16 VITALS
SYSTOLIC BLOOD PRESSURE: 108 MMHG | BODY MASS INDEX: 29.35 KG/M2 | WEIGHT: 187 LBS | HEIGHT: 67 IN | DIASTOLIC BLOOD PRESSURE: 70 MMHG

## 2024-05-16 DIAGNOSIS — N95.2 VAGINAL ATROPHY: ICD-10-CM

## 2024-05-16 DIAGNOSIS — Z12.4 SCREENING FOR MALIGNANT NEOPLASM OF CERVIX: ICD-10-CM

## 2024-05-16 DIAGNOSIS — Z12.31 VISIT FOR SCREENING MAMMOGRAM: ICD-10-CM

## 2024-05-16 DIAGNOSIS — Z01.419 WELL WOMAN EXAM: Primary | ICD-10-CM

## 2024-05-16 PROCEDURE — 99396 PREV VISIT EST AGE 40-64: CPT | Performed by: OBSTETRICS & GYNECOLOGY

## 2024-05-16 PROCEDURE — 99459 PELVIC EXAMINATION: CPT | Performed by: OBSTETRICS & GYNECOLOGY

## 2024-05-16 RX ORDER — ESTRADIOL 0.1 MG/G
1 CREAM VAGINAL
Qty: 42.5 G | Refills: 5 | Status: SHIPPED | OUTPATIENT
Start: 2024-05-16

## 2024-05-25 LAB
COLLECTION METHOD: NORMAL
CYTOLOGIST CVX/VAG CYTO: NORMAL
CYTOLOGY CVX/VAG DOC THIN PREP: NORMAL
HPV REFLEX: NORMAL
Lab: NORMAL
PAP SOURCE: NORMAL
PATH REPORT.FINAL DX SPEC: NORMAL
STAT OF ADQ CVX/VAG CYTO-IMP: NORMAL

## 2024-06-04 DIAGNOSIS — Z12.31 VISIT FOR SCREENING MAMMOGRAM: ICD-10-CM

## 2024-07-10 ENCOUNTER — APPOINTMENT (RX ONLY)
Dept: URBAN - METROPOLITAN AREA CLINIC 25 | Facility: CLINIC | Age: 59
Setting detail: DERMATOLOGY
End: 2024-07-10

## 2024-07-10 DIAGNOSIS — D22 MELANOCYTIC NEVI: ICD-10-CM

## 2024-07-10 DIAGNOSIS — L57.8 OTHER SKIN CHANGES DUE TO CHRONIC EXPOSURE TO NONIONIZING RADIATION: ICD-10-CM

## 2024-07-10 DIAGNOSIS — L71.8 OTHER ROSACEA: ICD-10-CM

## 2024-07-10 DIAGNOSIS — Z71.89 OTHER SPECIFIED COUNSELING: ICD-10-CM

## 2024-07-10 DIAGNOSIS — L21.8 OTHER SEBORRHEIC DERMATITIS: ICD-10-CM

## 2024-07-10 DIAGNOSIS — D69.2 OTHER NONTHROMBOCYTOPENIC PURPURA: ICD-10-CM

## 2024-07-10 PROBLEM — D22.72 MELANOCYTIC NEVI OF LEFT LOWER LIMB, INCLUDING HIP: Status: ACTIVE | Noted: 2024-07-10

## 2024-07-10 PROBLEM — D22.5 MELANOCYTIC NEVI OF TRUNK: Status: ACTIVE | Noted: 2024-07-10

## 2024-07-10 PROCEDURE — ? TREATMENT REGIMEN

## 2024-07-10 PROCEDURE — ? OTHER

## 2024-07-10 PROCEDURE — ? OBSERVATION

## 2024-07-10 PROCEDURE — ? COUNSELING

## 2024-07-10 PROCEDURE — 99214 OFFICE O/P EST MOD 30 MIN: CPT

## 2024-07-10 PROCEDURE — ? PRESCRIPTION

## 2024-07-10 RX ORDER — KETOCONAZOLE 20 MG/ML
SHAMPOO, SUSPENSION TOPICAL
Qty: 120 | Refills: 6 | Status: ERX | COMMUNITY
Start: 2024-07-10

## 2024-07-10 RX ORDER — HYDROCORTISONE 25 MG/G
CREAM TOPICAL BID
Qty: 30 | Refills: 3 | Status: ERX

## 2024-07-10 RX ADMIN — KETOCONAZOLE: 20 SHAMPOO, SUSPENSION TOPICAL at 00:00

## 2024-07-10 ASSESSMENT — LOCATION DETAILED DESCRIPTION DERM
LOCATION DETAILED: RIGHT MID-UPPER BACK
LOCATION DETAILED: STERNAL NOTCH
LOCATION DETAILED: RIGHT DISTAL DORSAL FOREARM
LOCATION DETAILED: RIGHT MEDIAL MALAR CHEEK
LOCATION DETAILED: LEFT MEDIAL DORSAL FOOT
LOCATION DETAILED: LEFT RIB CAGE
LOCATION DETAILED: POSTERIOR MID-PARIETAL SCALP

## 2024-07-10 ASSESSMENT — LOCATION ZONE DERM
LOCATION ZONE: ARM
LOCATION ZONE: TRUNK
LOCATION ZONE: FACE
LOCATION ZONE: FEET
LOCATION ZONE: SCALP

## 2024-07-10 ASSESSMENT — LOCATION SIMPLE DESCRIPTION DERM
LOCATION SIMPLE: POSTERIOR SCALP
LOCATION SIMPLE: LEFT FOOT
LOCATION SIMPLE: CHEST
LOCATION SIMPLE: RIGHT FOREARM
LOCATION SIMPLE: ABDOMEN
LOCATION SIMPLE: RIGHT UPPER BACK
LOCATION SIMPLE: RIGHT CHEEK

## 2024-07-10 NOTE — PROCEDURE: OTHER
Note Text (......Xxx Chief Complaint.): This diagnosis correlates with the
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 She has had basic labs checked by her PCP which showed normal platelet count and clotting factors. She uses an inhaled steroid but is not using any topical steroids on her arms. Discussed arnica, vitamin C, zinc oxide sunblock. Consider evaluation by hematology for more thorough bleeding workup given patient's concern and extensive involvement of her forearms, although I doubt anything else is going on as she denies history of GI bleeding/ bleeding gums/ etc.
Detail Level: Zone
Render Risk Assessment In Note?: no

## 2024-07-10 NOTE — PROCEDURE: COUNSELING
Detail Level: Generalized
Detail Level: Detailed
Patient Specific Counseling (Will Not Stick From Patient To Patient): Recommended to see hematologist.

## 2024-07-15 DIAGNOSIS — R23.3 EASY BRUISING: Primary | ICD-10-CM

## 2024-08-08 ENCOUNTER — TELEPHONE (OUTPATIENT)
Dept: FAMILY MEDICINE CLINIC | Facility: CLINIC | Age: 59
End: 2024-08-08

## 2024-08-08 ENCOUNTER — TELEMEDICINE (OUTPATIENT)
Dept: FAMILY MEDICINE CLINIC | Facility: CLINIC | Age: 59
End: 2024-08-08
Payer: COMMERCIAL

## 2024-08-08 DIAGNOSIS — U07.1 COVID: ICD-10-CM

## 2024-08-08 DIAGNOSIS — B96.89 ACUTE BACTERIAL SINUSITIS: Primary | ICD-10-CM

## 2024-08-08 DIAGNOSIS — J01.90 ACUTE BACTERIAL SINUSITIS: Primary | ICD-10-CM

## 2024-08-08 PROCEDURE — 99213 OFFICE O/P EST LOW 20 MIN: CPT | Performed by: FAMILY MEDICINE

## 2024-08-08 RX ORDER — CEFDINIR 300 MG/1
300 CAPSULE ORAL 2 TIMES DAILY
Qty: 20 CAPSULE | Refills: 0 | Status: SHIPPED | OUTPATIENT
Start: 2024-08-08 | End: 2024-08-18

## 2024-08-08 ASSESSMENT — ENCOUNTER SYMPTOMS
CHEST TIGHTNESS: 0
EYE DISCHARGE: 0
SHORTNESS OF BREATH: 0
CONSTIPATION: 0
COUGH: 0
SINUS PAIN: 1
DIARRHEA: 0
ABDOMINAL PAIN: 0

## 2024-08-08 ASSESSMENT — PATIENT HEALTH QUESTIONNAIRE - PHQ9
1. LITTLE INTEREST OR PLEASURE IN DOING THINGS: NOT AT ALL
2. FEELING DOWN, DEPRESSED OR HOPELESS: NOT AT ALL
SUM OF ALL RESPONSES TO PHQ QUESTIONS 1-9: 0
SUM OF ALL RESPONSES TO PHQ9 QUESTIONS 1 & 2: 0
SUM OF ALL RESPONSES TO PHQ QUESTIONS 1-9: 0

## 2024-08-08 NOTE — TELEPHONE ENCOUNTER
----- Message from Litzy Eli MD sent at 8/8/2024  9:55 AM EDT -----  Regarding: FW: Covid Positive  Contact: [phone]  Please work in for VV  ----- Message -----  From: ShaunAyleen figueredoarekacy Moralez  Sent: 8/7/2024   5:59 PM EDT  To: #  Subject: Covid Positive                                   I just took two at home covid tests and they came back positive.   My  was diagnosed on Sunday and started Plaxovid on Monday and an antibiotic today.    I just started symptoms this afternoon .  Can you call in a prescription for Plaxovid or another anti-viral medication?    I’m having my usual sinus and ear pressure on the right side, so I don’t know if I’m getting a sinus infection too.  Ugh!      Thank you

## 2024-08-08 NOTE — PROGRESS NOTES
Virtual Visit was conducted with patient's (and/or legal guardian's) consent. Patient identification was verified, and a caregiver was present when appropriate.   The patient was located at Home: [address] [facility] [id number]  Provider was located at Facility (Appt Dept): [address]  [place] [address]  Confirm you are appropriately licensed, registered, or certified to deliver care in the state where the patient is located as indicated above. If you are not or unsure, please re-schedule the visit: Yes, I confirm.       Total time spent on this encounter:  20min    --EZE PADILLA MD on 8/8/2024 at 3:53 PM    An electronic signature was used to authenticate this note.

## 2024-09-04 DIAGNOSIS — R23.3 EASY BRUISING: Primary | ICD-10-CM

## 2024-09-06 NOTE — PROGRESS NOTES
NEW PATIENT INTAKE    Referral Diagnosis: Easy bruising      Referring Provider: Litzy Eli MD    Primary Care Provider:Litzy Eli MD    Family History of Cancer/ Hematology Disorders: Father with lung cancer; sister#1 with liver cancer; sister#2 with lymphoma    Presenting Symptoms: Easy bruising    Chronological History of Pertinent Events:     59yo white female    PMH: Allergic rhinitis, Arthritis, Asthma, Mixed connective tissue disease, Plantar fasciitis of left foot    3/6/24 - Met with PCP ([facility])  Patient has noticed easy bruising over past 8-10 years.  Easy tears.  Most on arms.  No heavy periods in childhood or abnormal bleeding with surgery.  Brother  with cerebral aneurysm.  FH Dermatomyositis and mixed connective tissue disease.  No significant ETOH.  Labs drawn, all WNL except aPTT elevated at 37.6 (EPIC)    3/12/24 - Abnormal labs (EPIC)  MC - Positive  Anti-RNP - >8.0    24 - Patient message to PCP ([facility])  Patient spoke with her dermatologist about her skin.  Dermatologist recommended patient to see a hematologist.  While her arms did not look nearly as bad as they have in the past, she said that there was clearly bleeding under the skin.    Patient did reach out to my rheumatologist after we last communicated and she didn't think that this had anything to do with my positive MC test and that I should stay away from any blood thinners…which I have never taken.  Patient reported barely touching a corner of a bag of dog food in the store and it nicked her arm and it bled for a good 30 minutes.   Rfeerral placed with Hematology.    24 - VV with PCP ([facility])  + for COVID - Mucinex, Tylenol; Paxlovid if sxs worsen with hx of Asthma  Acute bacterial sinusitis - Rx: OMNICEF provided.    A referral has been placed with [facility] for a Medical Hematology consultation and treatment.      Notes from Referring Provider: N/A    Presented at Tumor Board: No    Other Pertinent Information:

## 2024-09-09 ENCOUNTER — OFFICE VISIT (OUTPATIENT)
Dept: ONCOLOGY | Age: 59
End: 2024-09-09
Payer: COMMERCIAL

## 2024-09-09 ENCOUNTER — HOSPITAL ENCOUNTER (OUTPATIENT)
Dept: LAB | Age: 59
Discharge: HOME | End: 2024-09-12
Payer: COMMERCIAL

## 2024-09-09 VITALS
OXYGEN SATURATION: 99 % | HEIGHT: 67 IN | BODY MASS INDEX: 30.4 KG/M2 | SYSTOLIC BLOOD PRESSURE: 126 MMHG | HEART RATE: 68 BPM | RESPIRATION RATE: 18 BRPM | WEIGHT: 193.7 LBS | TEMPERATURE: 97.8 F | DIASTOLIC BLOOD PRESSURE: 63 MMHG

## 2024-09-09 DIAGNOSIS — R23.3 EASY BRUISING: ICD-10-CM

## 2024-09-09 DIAGNOSIS — R23.3 EASY BRUISING: Primary | ICD-10-CM

## 2024-09-09 LAB
ALBUMIN SERPL-MCNC: 3.8 G/DL (ref 3.5–5)
ALBUMIN/GLOB SERPL: 1.3 (ref 1–1.9)
ALP SERPL-CCNC: 104 U/L (ref 35–104)
ALT SERPL-CCNC: 27 U/L (ref 12–65)
ANION GAP SERPL CALC-SCNC: 9 MMOL/L (ref 9–18)
AST SERPL-CCNC: 23 U/L (ref 15–37)
BASOPHILS # BLD: 0.1 K/UL (ref 0–0.2)
BASOPHILS NFR BLD: 1 % (ref 0–2)
BILIRUB SERPL-MCNC: 0.2 MG/DL (ref 0–1.2)
BUN SERPL-MCNC: 20 MG/DL (ref 6–23)
CALCIUM SERPL-MCNC: 10.2 MG/DL (ref 8.8–10.2)
CHLORIDE SERPL-SCNC: 103 MMOL/L (ref 98–107)
CO2 SERPL-SCNC: 29 MMOL/L (ref 20–28)
CREAT SERPL-MCNC: 0.79 MG/DL (ref 0.6–1.1)
DIFFERENTIAL METHOD BLD: NORMAL
EOSINOPHIL # BLD: 0.2 K/UL (ref 0–0.8)
EOSINOPHIL NFR BLD: 2 % (ref 0.5–7.8)
ERYTHROCYTE [DISTWIDTH] IN BLOOD BY AUTOMATED COUNT: 12.7 % (ref 11.9–14.6)
GLOBULIN SER CALC-MCNC: 3 G/DL (ref 2.3–3.5)
GLUCOSE SERPL-MCNC: 92 MG/DL (ref 70–99)
HCT VFR BLD AUTO: 44.8 % (ref 35.8–46.3)
HGB BLD-MCNC: 14.8 G/DL (ref 11.7–15.4)
IMM GRANULOCYTES # BLD AUTO: 0 K/UL (ref 0–0.5)
IMM GRANULOCYTES NFR BLD AUTO: 0 % (ref 0–5)
INR PPP: 0.9
LYMPHOCYTES # BLD: 1.8 K/UL (ref 0.5–4.6)
LYMPHOCYTES NFR BLD: 22 % (ref 13–44)
MCH RBC QN AUTO: 31 PG (ref 26.1–32.9)
MCHC RBC AUTO-ENTMCNC: 33 G/DL (ref 31.4–35)
MCV RBC AUTO: 93.7 FL (ref 82–102)
MONOCYTES # BLD: 0.6 K/UL (ref 0.1–1.3)
MONOCYTES NFR BLD: 7 % (ref 4–12)
NEUTS SEG # BLD: 5.7 K/UL (ref 1.7–8.2)
NEUTS SEG NFR BLD: 68 % (ref 43–78)
NRBC # BLD: 0.03 K/UL (ref 0–0.2)
PLATELET # BLD AUTO: 294 K/UL (ref 150–450)
PMV BLD AUTO: 10 FL (ref 9.4–12.3)
POTASSIUM SERPL-SCNC: 4.3 MMOL/L (ref 3.5–5.1)
PROT SERPL-MCNC: 6.9 G/DL (ref 6.3–8.2)
PROTHROMBIN TIME: 12.1 SEC (ref 11.3–14.9)
RBC # BLD AUTO: 4.78 M/UL (ref 4.05–5.2)
SODIUM SERPL-SCNC: 141 MMOL/L (ref 136–145)
WBC # BLD AUTO: 8.3 K/UL (ref 4.3–11.1)

## 2024-09-09 PROCEDURE — 36415 COLL VENOUS BLD VENIPUNCTURE: CPT

## 2024-09-09 PROCEDURE — 85610 PROTHROMBIN TIME: CPT

## 2024-09-09 PROCEDURE — 99204 OFFICE O/P NEW MOD 45 MIN: CPT | Performed by: INTERNAL MEDICINE

## 2024-09-09 PROCEDURE — 80053 COMPREHEN METABOLIC PANEL: CPT

## 2024-09-09 PROCEDURE — 85025 COMPLETE CBC W/AUTO DIFF WBC: CPT

## 2024-09-09 ASSESSMENT — PATIENT HEALTH QUESTIONNAIRE - PHQ9
SUM OF ALL RESPONSES TO PHQ QUESTIONS 1-9: 0
1. LITTLE INTEREST OR PLEASURE IN DOING THINGS: NOT AT ALL
2. FEELING DOWN, DEPRESSED OR HOPELESS: NOT AT ALL
SUM OF ALL RESPONSES TO PHQ9 QUESTIONS 1 & 2: 0
SUM OF ALL RESPONSES TO PHQ QUESTIONS 1-9: 0

## 2024-09-10 DIAGNOSIS — R23.3 EASY BRUISING: Primary | ICD-10-CM

## 2024-09-18 DIAGNOSIS — E78.5 HYPERLIPIDEMIA, UNSPECIFIED HYPERLIPIDEMIA TYPE: Primary | ICD-10-CM

## 2024-09-18 DIAGNOSIS — Z00.00 ROUTINE GENERAL MEDICAL EXAMINATION AT A HEALTH CARE FACILITY: ICD-10-CM

## 2024-09-24 ENCOUNTER — LAB (OUTPATIENT)
Dept: FAMILY MEDICINE CLINIC | Facility: CLINIC | Age: 59
End: 2024-09-24

## 2024-09-24 DIAGNOSIS — E78.5 HYPERLIPIDEMIA, UNSPECIFIED HYPERLIPIDEMIA TYPE: ICD-10-CM

## 2024-09-24 DIAGNOSIS — Z00.00 ROUTINE GENERAL MEDICAL EXAMINATION AT A HEALTH CARE FACILITY: ICD-10-CM

## 2024-09-24 LAB
ALBUMIN SERPL-MCNC: 3.7 G/DL (ref 3.5–5)
ALBUMIN/GLOB SERPL: 1.3 (ref 1–1.9)
ALP SERPL-CCNC: 93 U/L (ref 35–104)
ALT SERPL-CCNC: 45 U/L (ref 8–45)
ANION GAP SERPL CALC-SCNC: 11 MMOL/L (ref 9–18)
APPEARANCE UR: CLEAR
AST SERPL-CCNC: 35 U/L (ref 15–37)
BACTERIA URNS QL MICRO: NEGATIVE /HPF
BASOPHILS # BLD: 0 K/UL (ref 0–0.2)
BASOPHILS NFR BLD: 1 % (ref 0–2)
BILIRUB SERPL-MCNC: 0.5 MG/DL (ref 0–1.2)
BILIRUB UR QL: NEGATIVE
BUN SERPL-MCNC: 16 MG/DL (ref 6–23)
CALCIUM SERPL-MCNC: 9.4 MG/DL (ref 8.8–10.2)
CASTS URNS QL MICRO: 0 /LPF
CHLORIDE SERPL-SCNC: 102 MMOL/L (ref 98–107)
CHOLEST SERPL-MCNC: 186 MG/DL (ref 0–200)
CO2 SERPL-SCNC: 24 MMOL/L (ref 20–28)
COLOR UR: NORMAL
CREAT SERPL-MCNC: 0.77 MG/DL (ref 0.6–1.1)
CRYSTALS URNS QL MICRO: 0 /LPF
DIFFERENTIAL METHOD BLD: ABNORMAL
EOSINOPHIL # BLD: 0.2 K/UL (ref 0–0.8)
EOSINOPHIL NFR BLD: 3 % (ref 0.5–7.8)
EPI CELLS #/AREA URNS HPF: NORMAL /HPF (ref 0–5)
ERYTHROCYTE [DISTWIDTH] IN BLOOD BY AUTOMATED COUNT: 12.9 % (ref 11.9–14.6)
GLOBULIN SER CALC-MCNC: 2.9 G/DL (ref 2.3–3.5)
GLUCOSE SERPL-MCNC: 79 MG/DL (ref 70–99)
GLUCOSE UR STRIP.AUTO-MCNC: NEGATIVE MG/DL
HCT VFR BLD AUTO: 47.2 % (ref 35.8–46.3)
HDLC SERPL-MCNC: 58 MG/DL (ref 40–60)
HDLC SERPL: 3.2 (ref 0–5)
HGB BLD-MCNC: 14.9 G/DL (ref 11.7–15.4)
HGB UR QL STRIP: NEGATIVE
HYALINE CASTS URNS QL MICRO: NORMAL /LPF
IMM GRANULOCYTES # BLD AUTO: 0 K/UL (ref 0–0.5)
IMM GRANULOCYTES NFR BLD AUTO: 0 % (ref 0–5)
KETONES UR QL STRIP.AUTO: NEGATIVE MG/DL
LDLC SERPL CALC-MCNC: 110 MG/DL (ref 0–100)
LEUKOCYTE ESTERASE UR QL STRIP.AUTO: NEGATIVE
LYMPHOCYTES # BLD: 1.3 K/UL (ref 0.5–4.6)
LYMPHOCYTES NFR BLD: 25 % (ref 13–44)
MCH RBC QN AUTO: 31 PG (ref 26.1–32.9)
MCHC RBC AUTO-ENTMCNC: 31.6 G/DL (ref 31.4–35)
MCV RBC AUTO: 98.3 FL (ref 82–102)
MONOCYTES # BLD: 0.5 K/UL (ref 0.1–1.3)
MONOCYTES NFR BLD: 9 % (ref 4–12)
MUCOUS THREADS URNS QL MICRO: 0 /LPF
NEUTS SEG # BLD: 3.2 K/UL (ref 1.7–8.2)
NEUTS SEG NFR BLD: 62 % (ref 43–78)
NITRITE UR QL STRIP.AUTO: NEGATIVE
NRBC # BLD: 0 K/UL (ref 0–0.2)
PH UR STRIP: 7.5 (ref 5–9)
PLATELET # BLD AUTO: 266 K/UL (ref 150–450)
PMV BLD AUTO: 11.3 FL (ref 9.4–12.3)
POTASSIUM SERPL-SCNC: 4.5 MMOL/L (ref 3.5–5.1)
PROT SERPL-MCNC: 6.6 G/DL (ref 6.3–8.2)
PROT UR STRIP-MCNC: NEGATIVE MG/DL
RBC # BLD AUTO: 4.8 M/UL (ref 4.05–5.2)
RBC #/AREA URNS HPF: NORMAL /HPF (ref 0–5)
SODIUM SERPL-SCNC: 137 MMOL/L (ref 136–145)
SP GR UR REFRACTOMETRY: 1.01 (ref 1–1.02)
TRIGL SERPL-MCNC: 92 MG/DL (ref 0–150)
TSH W FREE THYROID IF ABNORMAL: 1.49 UIU/ML (ref 0.27–4.2)
URINE CULTURE IF INDICATED: NORMAL
UROBILINOGEN UR QL STRIP.AUTO: 0.2 EU/DL (ref 0.2–1)
VLDLC SERPL CALC-MCNC: 18 MG/DL (ref 6–23)
WBC # BLD AUTO: 5.2 K/UL (ref 4.3–11.1)
WBC URNS QL MICRO: NORMAL /HPF (ref 0–4)

## 2024-10-03 ENCOUNTER — OFFICE VISIT (OUTPATIENT)
Dept: FAMILY MEDICINE CLINIC | Facility: CLINIC | Age: 59
End: 2024-10-03
Payer: COMMERCIAL

## 2024-10-03 VITALS
OXYGEN SATURATION: 99 % | WEIGHT: 190.4 LBS | HEART RATE: 67 BPM | DIASTOLIC BLOOD PRESSURE: 74 MMHG | TEMPERATURE: 97.3 F | SYSTOLIC BLOOD PRESSURE: 124 MMHG | BODY MASS INDEX: 29.88 KG/M2 | HEIGHT: 67 IN

## 2024-10-03 DIAGNOSIS — R23.3 EASY BRUISING: ICD-10-CM

## 2024-10-03 DIAGNOSIS — Z80.0 FH: COLON CANCER: ICD-10-CM

## 2024-10-03 DIAGNOSIS — Z00.00 PREVENTATIVE HEALTH CARE: Primary | ICD-10-CM

## 2024-10-03 DIAGNOSIS — J45.20 MILD INTERMITTENT ASTHMA WITHOUT COMPLICATION: ICD-10-CM

## 2024-10-03 PROCEDURE — 99396 PREV VISIT EST AGE 40-64: CPT | Performed by: FAMILY MEDICINE

## 2024-10-03 ASSESSMENT — ENCOUNTER SYMPTOMS
DIARRHEA: 0
COUGH: 0
CHEST TIGHTNESS: 0
SHORTNESS OF BREATH: 0
CONSTIPATION: 0
ABDOMINAL PAIN: 0
SINUS PAIN: 0
EYE DISCHARGE: 0

## 2024-10-03 NOTE — PROGRESS NOTES
Rocio Dunn is a 58 y.o. female who presents with   Chief Complaint   Patient presents with    Annual Exam       History of Present Illness    Here for cpx. Labs look good. Easy bruising persists.No ASA use.   No excessive bleeding with prior surgery, including TKR.  Seeing hematology and awaiting genetics evaluation.  Hx mild intermittent asthma with no recent flares.  No cp or sob.  No gi or urinary sxs.  Labs look good.     Review of Systems  Review of Systems   Constitutional:  Negative for appetite change, fatigue and fever.   HENT:  Negative for congestion, ear pain and sinus pain.    Eyes:  Negative for discharge.   Respiratory:  Negative for cough, chest tightness and shortness of breath.    Cardiovascular:  Negative for chest pain, palpitations and leg swelling.   Gastrointestinal:  Negative for abdominal pain, constipation and diarrhea.   Genitourinary:  Negative for dysuria.   Musculoskeletal:  Negative for joint swelling.   Skin:  Negative for rash.   Neurological:  Negative for headaches.   Hematological:  Negative for adenopathy.   Psychiatric/Behavioral:  Negative for dysphoric mood. The patient is not nervous/anxious.         Medications  Current Outpatient Medications   Medication Sig Dispense Refill    NONFORMULARY Hair blend [id number]- take to capsules daily      estradiol (ESTRACE VAGINAL) 0.1 MG/GM vaginal cream Place 1 g vaginally Twice a Week 42.5 g 5    UNABLE TO FIND Take 3 tablets by mouth daily Calcium 905 mg, Vitamin D 1000 units per 3 tablets      Pseudoephedrine-guaiFENesin (MUCINEX D PO) Take 1 tablet by mouth daily      cetirizine (ZYRTEC) 10 MG tablet Take 1 tablet by mouth daily      Multiple Vitamins-Minerals (PRESERVISION AREDS 2) CAPS Take 1 capsule by mouth daily      vitamin C (ASCORBIC ACID) 500 MG tablet Take 2 tablets by mouth daily      Magnesium 400 MG TABS Take 2 tablets by mouth daily      B Complex Vitamins (VITAMIN B COMPLEX PO) Take by mouth      clobetasol

## 2024-10-15 ENCOUNTER — TELEPHONE (OUTPATIENT)
Dept: ONCOLOGY | Age: 59
End: 2024-10-15

## 2024-10-15 DIAGNOSIS — R23.3 EASY BRUISING: Primary | ICD-10-CM

## 2024-10-15 NOTE — TELEPHONE ENCOUNTER
Spoke to patient explaining Dr. Adame will order Platelet Aggregation testing at [facility]. Patient said she was called by a \"nurse\" who said [facility] will only see kids for the genetic testing. I told pt I will confirm with Dr. Adame that she is to go to [facility] for Platelet Aggregation testing and if there's any other testing that he would like to order.    Discussed with Dr. Adame who agrees the Genetic testing referral was for Ehler's Danlos Syndrome, etc... and Platelet Aggregation testing is something entirely different.    Left message for patient explaining we are talking about two different tests and to please call a back with how she wants to proceed.    Patient would like to proceed with Platelet Aggregation testing. [facility] can draw her blood on 10/24/24. Instructions sent to patient via [employer] message. Order for Platelet Aggregation testing faxed to [facility].

## 2025-01-31 ENCOUNTER — OFFICE VISIT (OUTPATIENT)
Dept: RHEUMATOLOGY | Age: 60
End: 2025-01-31
Payer: COMMERCIAL

## 2025-01-31 VITALS
SYSTOLIC BLOOD PRESSURE: 118 MMHG | BODY MASS INDEX: 30.54 KG/M2 | OXYGEN SATURATION: 98 % | HEIGHT: 67 IN | DIASTOLIC BLOOD PRESSURE: 76 MMHG | WEIGHT: 194.6 LBS | HEART RATE: 69 BPM

## 2025-01-31 DIAGNOSIS — M19.041 PRIMARY OSTEOARTHRITIS, RIGHT HAND: Primary | ICD-10-CM

## 2025-01-31 DIAGNOSIS — R76.8 POSITIVE ANA (ANTINUCLEAR ANTIBODY): ICD-10-CM

## 2025-01-31 DIAGNOSIS — L40.9 PSORIASIS, UNSPECIFIED: ICD-10-CM

## 2025-01-31 PROCEDURE — 99214 OFFICE O/P EST MOD 30 MIN: CPT | Performed by: INTERNAL MEDICINE

## 2025-01-31 RX ORDER — IBUPROFEN 200 MG
200 TABLET ORAL EVERY 6 HOURS PRN
COMMUNITY

## 2025-01-31 RX ORDER — NAPROXEN SODIUM 220 MG/1
220 TABLET, FILM COATED ORAL AS NEEDED
COMMUNITY

## 2025-01-31 ASSESSMENT — ROUTINE ASSESSMENT OF PATIENT INDEX DATA (RAPID3)
ON A SCALE OF ONE TO TEN, CONSIDERING ALL THE WAYS IN WHICH ILLNESS AND HEALTH CONDITIONS MAY AFFECT YOU AT THIS TIME, PLEASE INDICATE BELOW HOW YOU ARE DOING:: 1
ON A SCALE OF ONE TO TEN, HOW MUCH OF A PROBLEM HAS UNUSUAL FATIGUE OR TIREDNESS BEEN FOR YOU OVER THE PAST WEEK?: 0
ON A SCALE OF ONE TO TEN, HOW DIFFICULT WAS IT FOR YOU TO COMPLETE THE LISTED DAILY PHYSICAL TASKS OVER THE LAST WEEK: 0.0
WHEN YOU AWAKENED IN THE MORNING OVER THE LAST WEEK, PLEASE INDICATE THE AMOUNT OF TIME IT TAKES UNTIL YOU ARE AS LIMBER AS YOU WILL BE FOR THE DAY: < 10 MIN
ON A SCALE OF ONE TO TEN, HOW MUCH PAIN HAVE YOU HAD BECAUSE OF YOUR CONDITION OVER THE PAST WEEK?: 0

## 2025-01-31 ASSESSMENT — JOINT PAIN
TOTAL NUMBER OF TENDER JOINTS: 0
TOTAL NUMBER OF SWOLLEN JOINTS: 1

## 2025-01-31 NOTE — PROGRESS NOTES
[facility]  Joan Palacio M.D.  [address] , [address]   [place]  Office : [phone], Fax: [phone]     RHEUMATOLOGY OFFICE VISIT NOTE  Date of Visit:  2025 7:38 AM    Patient Information:  Name:  Rocio Dunn  :  1965  Age:  59 y.o.   Gender:  female      Ms. Dunn is here today for follow-up of psoriasis, OA and 3 positive anti-RNP antibody titers.     Last visit: 24    History of Present Illness: On talking to the patient today she states that she has been on Allergy shots every 3 weeks and uses an OTC antihistamine [cetirizine] and Singulair to help with her seasonal allergies.  She has had some left knee pain and swelling and has had the left knee synovial fluid aspirated and a cortisone shot administered into the left knee which does seem to have helped.  Patient's other current joint complaints are as mentioned below.    Since the last visit, patient is feeling \"very good\".    Pain: 1/10  Location: Some left knee pain with swelling with no warmth and redness. No buckling of the left knee. Some right hip pain with no groin pain. Some lower back pain. Some right para spinal muscle pain. Some tightness of her neck. No headaches. Bilateral thumb pain with no swelling, warmth and redness.   Quality:  Deep achy pain.   Modifying Factors:  Overuse worsens the knee pain.   Associated Symptoms: No tingling, numbness or pain down the arms or legs. No UE or LE weakness. No limitations with her ADL's.         2025     7:00 AM   DMARD/Biologic   AM Stiffness < 10 min   Pain 0   Fatigue 0   MDHAQ 0   Patient Global Score 1     Last TB screen: Years ago  TB result: negative      Current dose of steroids: None  How long on current dose of steroids: NA  How long on continuous steroid therapy: NA      Past DMARDs, if applicable (methotrexate, plaquenil/hydroxychloroquine, sulfasalazine, Arava/leflunomide): Never taken any of

## 2025-02-16 RX ORDER — MELOXICAM 15 MG/1
TABLET ORAL
Qty: 30 TABLET | Refills: 5 | OUTPATIENT
Start: 2025-02-16

## 2025-03-27 DIAGNOSIS — R23.3 EASY BRUISING: Primary | ICD-10-CM

## 2025-06-09 SDOH — ECONOMIC STABILITY: FOOD INSECURITY: WITHIN THE PAST 12 MONTHS, THE FOOD YOU BOUGHT JUST DIDN'T LAST AND YOU DIDN'T HAVE MONEY TO GET MORE.: NEVER TRUE

## 2025-06-09 SDOH — ECONOMIC STABILITY: FOOD INSECURITY: WITHIN THE PAST 12 MONTHS, YOU WORRIED THAT YOUR FOOD WOULD RUN OUT BEFORE YOU GOT MONEY TO BUY MORE.: NEVER TRUE

## 2025-06-09 SDOH — ECONOMIC STABILITY: INCOME INSECURITY: IN THE LAST 12 MONTHS, WAS THERE A TIME WHEN YOU WERE NOT ABLE TO PAY THE MORTGAGE OR RENT ON TIME?: NO

## 2025-06-09 SDOH — ECONOMIC STABILITY: TRANSPORTATION INSECURITY
IN THE PAST 12 MONTHS, HAS LACK OF TRANSPORTATION KEPT YOU FROM MEETINGS, WORK, OR FROM GETTING THINGS NEEDED FOR DAILY LIVING?: NO

## 2025-06-09 SDOH — ECONOMIC STABILITY: TRANSPORTATION INSECURITY
IN THE PAST 12 MONTHS, HAS THE LACK OF TRANSPORTATION KEPT YOU FROM MEDICAL APPOINTMENTS OR FROM GETTING MEDICATIONS?: NO

## 2025-06-11 ENCOUNTER — APPOINTMENT (OUTPATIENT)
Dept: URBAN - METROPOLITAN AREA CLINIC 25 | Facility: CLINIC | Age: 60
Setting detail: DERMATOLOGY
End: 2025-06-11

## 2025-06-11 DIAGNOSIS — Z71.89 OTHER SPECIFIED COUNSELING: ICD-10-CM

## 2025-06-11 DIAGNOSIS — L57.0 ACTINIC KERATOSIS: ICD-10-CM

## 2025-06-11 DIAGNOSIS — D69.2 OTHER NONTHROMBOCYTOPENIC PURPURA: ICD-10-CM

## 2025-06-11 DIAGNOSIS — L57.8 OTHER SKIN CHANGES DUE TO CHRONIC EXPOSURE TO NONIONIZING RADIATION: ICD-10-CM

## 2025-06-11 DIAGNOSIS — L71.8 OTHER ROSACEA: ICD-10-CM

## 2025-06-11 DIAGNOSIS — B00.1 HERPESVIRAL VESICULAR DERMATITIS: ICD-10-CM

## 2025-06-11 DIAGNOSIS — D22 MELANOCYTIC NEVI: ICD-10-CM

## 2025-06-11 DIAGNOSIS — L55.0 SUNBURN OF FIRST DEGREE: ICD-10-CM

## 2025-06-11 PROBLEM — D22.72 MELANOCYTIC NEVI OF LEFT LOWER LIMB, INCLUDING HIP: Status: ACTIVE | Noted: 2025-06-11

## 2025-06-11 PROBLEM — D22.5 MELANOCYTIC NEVI OF TRUNK: Status: ACTIVE | Noted: 2025-06-11

## 2025-06-11 PROCEDURE — ? PRESCRIPTION

## 2025-06-11 PROCEDURE — ? TREATMENT REGIMEN

## 2025-06-11 PROCEDURE — ? COUNSELING

## 2025-06-11 PROCEDURE — ? LIQUID NITROGEN

## 2025-06-11 PROCEDURE — ? OTHER

## 2025-06-11 PROCEDURE — ? OBSERVATION

## 2025-06-11 RX ORDER — VALACYCLOVIR HYDROCHLORIDE 1 G/1
TABLET, FILM COATED ORAL Q12 HOURS
Qty: 12 | Refills: 1 | Status: ERX | COMMUNITY
Start: 2025-06-11

## 2025-06-11 RX ORDER — TRIAMCINOLONE ACETONIDE 1 MG/G
CREAM TOPICAL
Qty: 45 | Refills: 0 | Status: ERX

## 2025-06-11 RX ADMIN — VALACYCLOVIR HYDROCHLORIDE: 1 TABLET, FILM COATED ORAL at 00:00

## 2025-06-11 ASSESSMENT — LOCATION SIMPLE DESCRIPTION DERM
LOCATION SIMPLE: LEFT ZYGOMA
LOCATION SIMPLE: RIGHT UPPER BACK
LOCATION SIMPLE: ABDOMEN
LOCATION SIMPLE: LEFT CHEEK
LOCATION SIMPLE: RIGHT FOREARM
LOCATION SIMPLE: RIGHT CHEEK
LOCATION SIMPLE: LEFT LIP
LOCATION SIMPLE: CHEST
LOCATION SIMPLE: LEFT FOOT
LOCATION SIMPLE: RIGHT ZYGOMA

## 2025-06-11 ASSESSMENT — LOCATION DETAILED DESCRIPTION DERM
LOCATION DETAILED: STERNAL NOTCH
LOCATION DETAILED: LEFT MEDIAL DORSAL FOOT
LOCATION DETAILED: RIGHT MID-UPPER BACK
LOCATION DETAILED: RIGHT MEDIAL MALAR CHEEK
LOCATION DETAILED: RIGHT LATERAL ZYGOMA
LOCATION DETAILED: LEFT MEDIAL ZYGOMA
LOCATION DETAILED: LEFT RIB CAGE
LOCATION DETAILED: LEFT INFERIOR VERMILION LIP
LOCATION DETAILED: MIDDLE STERNUM
LOCATION DETAILED: LEFT MEDIAL MALAR CHEEK
LOCATION DETAILED: RIGHT DISTAL DORSAL FOREARM

## 2025-06-11 ASSESSMENT — LOCATION ZONE DERM
LOCATION ZONE: ARM
LOCATION ZONE: TRUNK
LOCATION ZONE: FEET
LOCATION ZONE: FACE
LOCATION ZONE: LIP

## 2025-06-11 NOTE — PROGRESS NOTES
affect    Integumentary   No rashes;  no suspicious lesions.     Head and Neck  Head - no lesions or palpable masses.   Neck -  normal   Thyroid - normal size and consistency;  no palpable nodules.      Chest and Lung Exam   Chest and lung exam - normal breath sounds    Cardiovascular   Cardiovascular examination  - normal heart sounds, regular rate and rhythm with no murmurs.     Breast  Left - Normal.  Right - Normal.     Abdomen   Inspection: - Normal.   Palpation/Percussion -  Normal  Auscultation:  - Bowel sounds normal.     Female Genitourinary     External Genitalia   Vulva: Normal.   Perineum: Normal.   Bartholin's Gland:  Normal.   Clitoris :  Normal.   Introitus:  Normal.   Urethra:  Normal.     Speculum & Bimanual   Vagina: -  Normal.   Vaginal Lesions - None.   Cervix: Characteristics - Normal.   Uterus: Characteristics - Normal.   Adnexa: - Normal.   Bladder - Normal.           Medical problems and test results were reviewed with the patient today.       ASSESSMENT and PLAN    1. Well woman exam  2. Visit for screening mammogram  -     [facility] DIGITAL SCREEN BILATERAL; Future  3. Screening for malignant neoplasm of cervix  -     PAP LB, Reflex HPV ASCUS  4. Vaginal atrophy  -     estradiol (ESTRACE VAGINAL) 0.1 MG/GM vaginal cream; Place 1 g vaginally Twice a Week, Disp-42.5 g, R-5Normal             Chaperone utilized during exam      DEMETRIA JAIME MD  6/12/2025

## 2025-06-12 ENCOUNTER — OFFICE VISIT (OUTPATIENT)
Dept: OBGYN CLINIC | Age: 60
End: 2025-06-12
Payer: COMMERCIAL

## 2025-06-12 VITALS
HEIGHT: 67 IN | SYSTOLIC BLOOD PRESSURE: 110 MMHG | WEIGHT: 196 LBS | DIASTOLIC BLOOD PRESSURE: 70 MMHG | BODY MASS INDEX: 30.76 KG/M2

## 2025-06-12 DIAGNOSIS — Z12.31 VISIT FOR SCREENING MAMMOGRAM: ICD-10-CM

## 2025-06-12 DIAGNOSIS — N95.2 VAGINAL ATROPHY: ICD-10-CM

## 2025-06-12 DIAGNOSIS — Z12.4 SCREENING FOR MALIGNANT NEOPLASM OF CERVIX: ICD-10-CM

## 2025-06-12 DIAGNOSIS — Z01.419 WELL WOMAN EXAM: Primary | ICD-10-CM

## 2025-06-12 PROCEDURE — 99459 PELVIC EXAMINATION: CPT | Performed by: OBSTETRICS & GYNECOLOGY

## 2025-06-12 PROCEDURE — 99396 PREV VISIT EST AGE 40-64: CPT | Performed by: OBSTETRICS & GYNECOLOGY

## 2025-06-12 RX ORDER — ESTRADIOL 0.1 MG/G
1 CREAM VAGINAL
Qty: 42.5 G | Refills: 5 | Status: SHIPPED | OUTPATIENT
Start: 2025-06-12

## 2025-06-12 RX ORDER — VALACYCLOVIR HYDROCHLORIDE 1 G/1
TABLET, FILM COATED ORAL
COMMUNITY
Start: 2025-06-11

## 2025-06-17 LAB
COLLECTION METHOD: NORMAL
CYTOLOGIST CVX/VAG CYTO: NORMAL
CYTOLOGY CVX/VAG DOC THIN PREP: NORMAL
DATE OF LMP: 0
HPV REFLEX: NORMAL
Lab: NORMAL
Lab: NORMAL
PAP SOURCE: NORMAL
PATH REPORT.FINAL DX SPEC: NORMAL
STAT OF ADQ CVX/VAG CYTO-IMP: NORMAL

## 2025-06-18 DIAGNOSIS — Z12.31 VISIT FOR SCREENING MAMMOGRAM: ICD-10-CM

## 2025-08-06 ENCOUNTER — LAB (OUTPATIENT)
Dept: FAMILY MEDICINE CLINIC | Facility: CLINIC | Age: 60
End: 2025-08-06

## 2025-08-06 DIAGNOSIS — E78.00 ELEVATED LDL CHOLESTEROL LEVEL: ICD-10-CM

## 2025-08-06 DIAGNOSIS — Z00.00 ROUTINE GENERAL MEDICAL EXAMINATION AT A HEALTH CARE FACILITY: ICD-10-CM

## 2025-08-06 DIAGNOSIS — E78.00 ELEVATED LDL CHOLESTEROL LEVEL: Primary | ICD-10-CM

## 2025-08-06 LAB
ALBUMIN SERPL-MCNC: 3.7 G/DL (ref 3.2–4.6)
ALBUMIN/GLOB SERPL: 1.2 (ref 1–1.9)
ALP SERPL-CCNC: 96 U/L (ref 35–104)
ALT SERPL-CCNC: 42 U/L (ref 8–45)
ANION GAP SERPL CALC-SCNC: 10 MMOL/L (ref 7–16)
APPEARANCE UR: CLEAR
AST SERPL-CCNC: 25 U/L (ref 15–37)
BACTERIA URNS QL MICRO: NEGATIVE /HPF
BASOPHILS # BLD: 0.04 K/UL (ref 0–0.2)
BASOPHILS NFR BLD: 0.5 % (ref 0–2)
BILIRUB SERPL-MCNC: 0.5 MG/DL (ref 0–1.2)
BILIRUB UR QL: NEGATIVE
BUN SERPL-MCNC: 16 MG/DL (ref 8–23)
CALCIUM SERPL-MCNC: 9.9 MG/DL (ref 8.8–10.2)
CASTS URNS QL MICRO: 0 /LPF
CHLORIDE SERPL-SCNC: 102 MMOL/L (ref 98–107)
CHOLEST SERPL-MCNC: 192 MG/DL (ref 0–200)
CO2 SERPL-SCNC: 27 MMOL/L (ref 20–29)
COLOR UR: ABNORMAL
CREAT SERPL-MCNC: 0.77 MG/DL (ref 0.6–1.1)
CRYSTALS URNS QL MICRO: 0 /LPF
DIFFERENTIAL METHOD BLD: NORMAL
EOSINOPHIL # BLD: 0.11 K/UL (ref 0–0.8)
EOSINOPHIL NFR BLD: 1.4 % (ref 0.5–7.8)
EPI CELLS #/AREA URNS HPF: ABNORMAL /HPF (ref 0–5)
ERYTHROCYTE [DISTWIDTH] IN BLOOD BY AUTOMATED COUNT: 13.1 % (ref 11.9–14.6)
GLOBULIN SER CALC-MCNC: 3 G/DL (ref 2.3–3.5)
GLUCOSE SERPL-MCNC: 87 MG/DL (ref 70–99)
GLUCOSE UR STRIP.AUTO-MCNC: NEGATIVE MG/DL
HCT VFR BLD AUTO: 42.8 % (ref 35.8–46.3)
HDLC SERPL-MCNC: 63 MG/DL (ref 40–60)
HDLC SERPL: 3 (ref 0–5)
HGB BLD-MCNC: 14 G/DL (ref 11.7–15.4)
HGB UR QL STRIP: NEGATIVE
HYALINE CASTS URNS QL MICRO: ABNORMAL /LPF
IMM GRANULOCYTES # BLD AUTO: 0.03 K/UL (ref 0–0.5)
IMM GRANULOCYTES NFR BLD AUTO: 0.4 % (ref 0–5)
KETONES UR QL STRIP.AUTO: ABNORMAL MG/DL
LDLC SERPL CALC-MCNC: 109 MG/DL (ref 0–100)
LEUKOCYTE ESTERASE UR QL STRIP.AUTO: NEGATIVE
LYMPHOCYTES # BLD: 1.87 K/UL (ref 0.5–4.6)
LYMPHOCYTES NFR BLD: 23.8 % (ref 13–44)
MCH RBC QN AUTO: 32.1 PG (ref 26.1–32.9)
MCHC RBC AUTO-ENTMCNC: 32.7 G/DL (ref 31.4–35)
MCV RBC AUTO: 98.2 FL (ref 82–102)
MONOCYTES # BLD: 0.62 K/UL (ref 0.1–1.3)
MONOCYTES NFR BLD: 7.9 % (ref 4–12)
MUCOUS THREADS URNS QL MICRO: 0 /LPF
NEUTS SEG # BLD: 5.2 K/UL (ref 1.7–8.2)
NEUTS SEG NFR BLD: 66 % (ref 43–78)
NITRITE UR QL STRIP.AUTO: NEGATIVE
NRBC # BLD: 0 K/UL (ref 0–0.2)
PH UR STRIP: 6.5 (ref 5–9)
PLATELET # BLD AUTO: 315 K/UL (ref 150–450)
PMV BLD AUTO: 10.7 FL (ref 9.4–12.3)
POTASSIUM SERPL-SCNC: 4.7 MMOL/L (ref 3.5–5.1)
PROT SERPL-MCNC: 6.7 G/DL (ref 6.3–8.2)
PROT UR STRIP-MCNC: NEGATIVE MG/DL
RBC # BLD AUTO: 4.36 M/UL (ref 4.05–5.2)
RBC #/AREA URNS HPF: ABNORMAL /HPF (ref 0–5)
SODIUM SERPL-SCNC: 138 MMOL/L (ref 136–145)
SP GR UR REFRACTOMETRY: 1.02 (ref 1–1.02)
TRIGL SERPL-MCNC: 98 MG/DL (ref 0–150)
TSH W FREE THYROID IF ABNORMAL: 2.14 UIU/ML (ref 0.27–4.2)
URINE CULTURE IF INDICATED: ABNORMAL
UROBILINOGEN UR QL STRIP.AUTO: 0.2 EU/DL (ref 0.2–1)
VLDLC SERPL CALC-MCNC: 20 MG/DL (ref 6–23)
WBC # BLD AUTO: 7.9 K/UL (ref 4.3–11.1)
WBC URNS QL MICRO: ABNORMAL /HPF (ref 0–4)

## 2025-08-07 ENCOUNTER — OFFICE VISIT (OUTPATIENT)
Dept: RHEUMATOLOGY | Age: 60
End: 2025-08-07
Payer: COMMERCIAL

## 2025-08-07 VITALS
HEIGHT: 67 IN | BODY MASS INDEX: 30.07 KG/M2 | OXYGEN SATURATION: 95 % | WEIGHT: 191.6 LBS | SYSTOLIC BLOOD PRESSURE: 112 MMHG | HEART RATE: 77 BPM | DIASTOLIC BLOOD PRESSURE: 72 MMHG

## 2025-08-07 DIAGNOSIS — R76.8 POSITIVE ANA (ANTINUCLEAR ANTIBODY): ICD-10-CM

## 2025-08-07 DIAGNOSIS — L40.9 PSORIASIS, UNSPECIFIED: ICD-10-CM

## 2025-08-07 DIAGNOSIS — M19.041 PRIMARY OSTEOARTHRITIS, RIGHT HAND: Primary | ICD-10-CM

## 2025-08-07 PROCEDURE — 99214 OFFICE O/P EST MOD 30 MIN: CPT | Performed by: INTERNAL MEDICINE

## 2025-08-07 RX ORDER — DOXYCYCLINE 100 MG/1
100 CAPSULE ORAL 2 TIMES DAILY
COMMUNITY
Start: 2025-07-28

## 2025-08-07 ASSESSMENT — ROUTINE ASSESSMENT OF PATIENT INDEX DATA (RAPID3)
ON A SCALE OF ONE TO TEN, HOW MUCH PAIN HAVE YOU HAD BECAUSE OF YOUR CONDITION OVER THE PAST WEEK?: 0
ON A SCALE OF ONE TO TEN, HOW DIFFICULT WAS IT FOR YOU TO COMPLETE THE LISTED DAILY PHYSICAL TASKS OVER THE LAST WEEK: 0.0
WHEN YOU AWAKENED IN THE MORNING OVER THE LAST WEEK, PLEASE INDICATE THE AMOUNT OF TIME IT TAKES UNTIL YOU ARE AS LIMBER AS YOU WILL BE FOR THE DAY: < 10 MIN
ON A SCALE OF ONE TO TEN, HOW MUCH OF A PROBLEM HAS UNUSUAL FATIGUE OR TIREDNESS BEEN FOR YOU OVER THE PAST WEEK?: 1
ON A SCALE OF ONE TO TEN, CONSIDERING ALL THE WAYS IN WHICH ILLNESS AND HEALTH CONDITIONS MAY AFFECT YOU AT THIS TIME, PLEASE INDICATE BELOW HOW YOU ARE DOING:: 1

## 2025-08-07 ASSESSMENT — JOINT PAIN
TOTAL NUMBER OF TENDER JOINTS: 2
TOTAL NUMBER OF SWOLLEN JOINTS: 1

## 2025-08-13 ENCOUNTER — OFFICE VISIT (OUTPATIENT)
Dept: FAMILY MEDICINE CLINIC | Facility: CLINIC | Age: 60
End: 2025-08-13
Payer: COMMERCIAL

## 2025-08-13 VITALS
WEIGHT: 193 LBS | SYSTOLIC BLOOD PRESSURE: 126 MMHG | BODY MASS INDEX: 30.68 KG/M2 | OXYGEN SATURATION: 93 % | HEART RATE: 73 BPM | TEMPERATURE: 97.1 F | DIASTOLIC BLOOD PRESSURE: 62 MMHG

## 2025-08-13 DIAGNOSIS — Z23 NEED FOR PNEUMOCOCCAL VACCINATION: ICD-10-CM

## 2025-08-13 DIAGNOSIS — J45.20 MILD INTERMITTENT ASTHMA WITHOUT COMPLICATION: ICD-10-CM

## 2025-08-13 DIAGNOSIS — M85.80 OSTEOPENIA, UNSPECIFIED LOCATION: ICD-10-CM

## 2025-08-13 DIAGNOSIS — R76.8 HIGH TOTAL SERUM IGM: ICD-10-CM

## 2025-08-13 DIAGNOSIS — Z00.00 PREVENTATIVE HEALTH CARE: Primary | ICD-10-CM

## 2025-08-13 PROCEDURE — 90677 PCV20 VACCINE IM: CPT | Performed by: FAMILY MEDICINE

## 2025-08-13 PROCEDURE — 90471 IMMUNIZATION ADMIN: CPT | Performed by: FAMILY MEDICINE

## 2025-08-13 PROCEDURE — 99396 PREV VISIT EST AGE 40-64: CPT | Performed by: FAMILY MEDICINE

## 2025-08-13 ASSESSMENT — PATIENT HEALTH QUESTIONNAIRE - PHQ9
SUM OF ALL RESPONSES TO PHQ QUESTIONS 1-9: 0
2. FEELING DOWN, DEPRESSED OR HOPELESS: NOT AT ALL
2. FEELING DOWN, DEPRESSED OR HOPELESS: NOT AT ALL
SUM OF ALL RESPONSES TO PHQ QUESTIONS 1-9: 0
SUM OF ALL RESPONSES TO PHQ9 QUESTIONS 1 & 2: 0
SUM OF ALL RESPONSES TO PHQ QUESTIONS 1-9: 0
1. LITTLE INTEREST OR PLEASURE IN DOING THINGS: NOT AT ALL
SUM OF ALL RESPONSES TO PHQ QUESTIONS 1-9: 0
1. LITTLE INTEREST OR PLEASURE IN DOING THINGS: NOT AT ALL

## 2025-08-13 ASSESSMENT — ENCOUNTER SYMPTOMS
CHEST TIGHTNESS: 0
SHORTNESS OF BREATH: 0
CONSTIPATION: 0
COUGH: 0
EYE DISCHARGE: 0
ABDOMINAL PAIN: 0
SINUS PAIN: 0
DIARRHEA: 0